# Patient Record
Sex: FEMALE | Race: WHITE | Employment: UNEMPLOYED
[De-identification: names, ages, dates, MRNs, and addresses within clinical notes are randomized per-mention and may not be internally consistent; named-entity substitution may affect disease eponyms.]

---

## 2017-03-08 ENCOUNTER — OFFICE VISIT (OUTPATIENT)
Dept: INTERNAL MEDICINE | Facility: CLINIC | Age: 67
End: 2017-03-08

## 2017-03-08 VITALS
DIASTOLIC BLOOD PRESSURE: 85 MMHG | SYSTOLIC BLOOD PRESSURE: 156 MMHG | HEART RATE: 77 BPM | WEIGHT: 261 LBS | OXYGEN SATURATION: 100 % | BODY MASS INDEX: 40.88 KG/M2

## 2017-03-08 DIAGNOSIS — M25.512 LEFT SHOULDER PAIN, UNSPECIFIED CHRONICITY: ICD-10-CM

## 2017-03-08 DIAGNOSIS — I10 ESSENTIAL HYPERTENSION: ICD-10-CM

## 2017-03-08 DIAGNOSIS — Z12.11 COLON CANCER SCREENING: Primary | ICD-10-CM

## 2017-03-08 DIAGNOSIS — M15.9 PRIMARY OSTEOARTHRITIS INVOLVING MULTIPLE JOINTS: ICD-10-CM

## 2017-03-08 DIAGNOSIS — Z12.39 BREAST CANCER SCREENING: ICD-10-CM

## 2017-03-08 DIAGNOSIS — E78.01 FAMILIAL HYPERCHOLESTEROLEMIA: ICD-10-CM

## 2017-03-08 DIAGNOSIS — F41.8 ANXIOUS DEPRESSION: ICD-10-CM

## 2017-03-08 DIAGNOSIS — K21.9 GASTROESOPHAGEAL REFLUX DISEASE WITHOUT ESOPHAGITIS: ICD-10-CM

## 2017-03-08 DIAGNOSIS — M25.571 ACUTE RIGHT ANKLE PAIN: ICD-10-CM

## 2017-03-08 DIAGNOSIS — Z13.9 SCREENING: ICD-10-CM

## 2017-03-08 DIAGNOSIS — Z91.81 AT HIGH RISK FOR FALLS: ICD-10-CM

## 2017-03-08 PROBLEM — M66.871 NONTRAUMATIC RUPTURE OF RIGHT POSTERIOR TIBIAL TENDON: Status: ACTIVE | Noted: 2017-03-08

## 2017-03-08 PROCEDURE — 99397 PER PM REEVAL EST PAT 65+ YR: CPT | Performed by: INTERNAL MEDICINE

## 2017-03-08 RX ORDER — PRAVASTATIN SODIUM 40 MG
40 TABLET ORAL DAILY
Qty: 90 TABLET | Refills: 3 | Status: SHIPPED | OUTPATIENT
Start: 2017-03-08 | End: 2017-12-18 | Stop reason: ALTCHOICE

## 2017-03-08 RX ORDER — TRIAMTERENE AND HYDROCHLOROTHIAZIDE 37.5; 25 MG/1; MG/1
1 TABLET ORAL DAILY PRN
Qty: 90 TABLET | Refills: 3 | Status: SHIPPED | OUTPATIENT
Start: 2017-03-08 | End: 2018-05-01 | Stop reason: SDUPTHER

## 2017-03-08 RX ORDER — CLORAZEPATE DIPOTASSIUM 7.5 MG/1
TABLET ORAL
Qty: 30 TABLET | Refills: 3 | Status: SHIPPED | OUTPATIENT
Start: 2017-03-08 | End: 2017-12-18 | Stop reason: ALTCHOICE

## 2017-03-08 RX ORDER — CIPROFLOXACIN 500 MG/1
500 TABLET, FILM COATED ORAL 2 TIMES DAILY
COMMUNITY
End: 2017-03-08 | Stop reason: SDUPTHER

## 2017-03-08 RX ORDER — POTASSIUM CHLORIDE 750 MG/1
10 TABLET, FILM COATED, EXTENDED RELEASE ORAL DAILY
Qty: 90 TABLET | Refills: 3 | Status: SHIPPED | OUTPATIENT
Start: 2017-03-08 | End: 2017-12-18 | Stop reason: SDUPTHER

## 2017-03-08 RX ORDER — CELECOXIB 200 MG/1
200 CAPSULE ORAL 2 TIMES DAILY
Qty: 180 CAPSULE | Refills: 1 | Status: SHIPPED | OUTPATIENT
Start: 2017-03-08 | End: 2018-05-01 | Stop reason: SDUPTHER

## 2017-03-08 RX ORDER — PANTOPRAZOLE SODIUM 40 MG/1
40 TABLET, DELAYED RELEASE ORAL DAILY
Qty: 90 TABLET | Refills: 3 | Status: SHIPPED | OUTPATIENT
Start: 2017-03-08 | End: 2018-05-01 | Stop reason: SDUPTHER

## 2017-03-08 RX ORDER — CIPROFLOXACIN 500 MG/1
500 TABLET, FILM COATED ORAL 2 TIMES DAILY
Qty: 20 TABLET | Refills: 0 | Status: SHIPPED | OUTPATIENT
Start: 2017-03-08 | End: 2018-06-18

## 2017-03-08 ASSESSMENT — PATIENT HEALTH QUESTIONNAIRE - PHQ9
SUM OF ALL RESPONSES TO PHQ QUESTIONS 1-9: 2
SUM OF ALL RESPONSES TO PHQ9 QUESTIONS 1 & 2: 2
2. FEELING DOWN, DEPRESSED OR HOPELESS: 1
1. LITTLE INTEREST OR PLEASURE IN DOING THINGS: 1

## 2017-03-10 ENCOUNTER — TELEPHONE (OUTPATIENT)
Dept: INTERNAL MEDICINE | Facility: CLINIC | Age: 67
End: 2017-03-10

## 2017-11-21 ENCOUNTER — TELEPHONE (OUTPATIENT)
Dept: INTERNAL MEDICINE | Age: 67
End: 2017-11-21

## 2017-12-18 ENCOUNTER — OFFICE VISIT (OUTPATIENT)
Dept: INTERNAL MEDICINE | Age: 67
End: 2017-12-18
Payer: MEDICARE

## 2017-12-18 VITALS
HEART RATE: 98 BPM | WEIGHT: 275 LBS | HEIGHT: 67 IN | DIASTOLIC BLOOD PRESSURE: 86 MMHG | SYSTOLIC BLOOD PRESSURE: 141 MMHG | OXYGEN SATURATION: 94 % | BODY MASS INDEX: 43.16 KG/M2

## 2017-12-18 DIAGNOSIS — E66.01 MORBID OBESITY WITH BMI OF 40.0-44.9, ADULT (HCC): ICD-10-CM

## 2017-12-18 DIAGNOSIS — K21.9 GASTROESOPHAGEAL REFLUX DISEASE WITHOUT ESOPHAGITIS: ICD-10-CM

## 2017-12-18 DIAGNOSIS — M19.90 OSTEOARTHRITIS, UNSPECIFIED OSTEOARTHRITIS TYPE, UNSPECIFIED SITE: Primary | ICD-10-CM

## 2017-12-18 DIAGNOSIS — E78.01 FAMILIAL HYPERCHOLESTEROLEMIA: ICD-10-CM

## 2017-12-18 DIAGNOSIS — F41.8 ANXIOUS DEPRESSION: ICD-10-CM

## 2017-12-18 PROCEDURE — 99214 OFFICE O/P EST MOD 30 MIN: CPT | Performed by: INTERNAL MEDICINE

## 2017-12-18 ASSESSMENT — ENCOUNTER SYMPTOMS
ABDOMINAL PAIN: 0
SINUS PRESSURE: 0
BACK PAIN: 1
ABDOMINAL DISTENTION: 0

## 2017-12-18 NOTE — PROGRESS NOTES
[facility]   Progress Note      Visit Information    Have you changed or started any medications since your last visit including any over-the-counter medicines, vitamins, or herbal medicines? no   Are you having any side effects from any of your medications? -  yes - Pravastatin, leg cramps and muscle pain  Have you stopped taking any of your medications? Is so, why? -  yes - Pravastatin    Have you seen any other physician or provider since your last visit? Yes - Records Obtained  Have you had any other diagnostic tests since your last visit? No  Have you been seen in the emergency room and/or had an admission to a hospital since we last saw you? No  Have you had your routine dental cleaning in the past 6 months? no    Have you activated your [employer] account? If not, what are your barriers?  Yes     Patient Care Team:  Treasure Méndez MD as PCP - General (Internal Medicine)  Laura Bunch MD as PCP - S Attributed Provider    Medical History Review  Past Medical, Family, and Social History reviewed and does contribute to the patient presenting condition    Health Maintenance   Topic Date Due    Breast cancer screen  03/03/2002    Colon cancer screen colonoscopy  03/03/2002    DEXA (modify frequency per FRAX score)  03/03/2017    Zostavax vaccine  01/18/2018 (Originally 3/3/2012)    Cervical cancer screen  01/18/2018 (Originally 3/3/1973)    Flu vaccine (1) 01/18/2018 (Originally 9/1/2017)    Pneumococcal low/med risk (1 of 2 - PCV13) 01/18/2018 (Originally 3/3/2017)    Hepatitis C screen  01/18/2018 (Originally 1950)    HIV screen  01/18/2018 (Originally 3/3/1967)    Lipid screen  10/27/2020         Date of patient's visit: 12/18/2017  Patient's Name:  Teri Khan                   YOB: 1950        PCP:  Treasure Méndez MD    Teri Khan is a 74 y.o. female who presents for   Chief Complaint   Patient presents with   Clgaylaia Marcellet Annual Exam    and follow up of chronic medical problems. Patient Active Problem List   Diagnosis    Left shoulder pain    Patellar dislocation    Osteoarthritis    HBP (high blood pressure)    Chronic fatigue and immune dysfunction syndrome (HCC)    GERD (gastroesophageal reflux disease)    Family history of premature CAD    History of repair of right rotator cuff    S/P lateral meniscectomy of left knee    Bone, giant cell tumor    CTS (carpal tunnel syndrome)    Anxious depression    Hyperlipidemia    Prediabetes    Nontraumatic rupture of right posterior tibial tendon       HISTORY OF PRESENT ILLNESS:    History was obtained from the patient. Patient here for routine follow-up. She has a history of hypertension on Maxzide and is well-controlled. She follows a DASH diet. Patient does report that she takes potassium supplements on the side  Patient also reports a history of depression from long-standing issues with job resignation and multiple accidents causing some component of posttraumatic stress disorder. She is on Zoloft and sees a therapist  Patient also has a history of GERD and is on Protonix, which controls symptoms  Patient has a history significant for osteoarthritis and uses Celebrex has had no break in the past and has not responded well to it  Patient's allergies, medications, past medical, surgical, social and family histories were reviewed and updated as appropriate.     ALLERGIES      Allergies   Allergen Reactions    Bee Venom Anaphylaxis    Tetanus Toxoids Anaphylaxis    Naproxen Swelling    Ancef [Cefazolin]     Formaldehyde          MEDICATIONS:      Current Outpatient Prescriptions   Medication Sig Dispense Refill    celecoxib (CELEBREX) 200 MG capsule Take 1 capsule by mouth 2 times daily 180 capsule 1    clorazepate (TRANXENE-T) 7.5 MG tablet One for anxiety if needed up to twice daily 30 tablet 3    potassium chloride (KLOR-CON 10) 10 MEQ extended release tablet Take 1 tablet by mouth daily 90 tablet 3    pravastatin (PRAVACHOL) 40 MG tablet Take 1 tablet by mouth daily 90 tablet 3    sertraline (ZOLOFT) 50 MG tablet Take 1 tablet by mouth daily 90 tablet 3    triamterene-hydrochlorothiazide (MAXZIDE-25) 37.5-25 MG per tablet Take 1 tablet by mouth daily as needed (edema) 90 tablet 3    pantoprazole (PROTONIX) 40 MG tablet Take 1 tablet by mouth daily 90 tablet 3    ciprofloxacin (CIPRO) 500 MG tablet Take 1 tablet by mouth 2 times daily Take 1 pill BID for 10 days 20 tablet 0    KLOR-CON M10 10 MEQ tablet       Blood Glucose Monitoring Suppl (BLOOD GLUCOSE METER) KIT Test ___ times daily    Dx: 250.00  Diabetes Mellitus____Insulin Dependent____Non-Insulin Dependent 1 kit 0    Glucose Blood (BLOOD GLUCOSE TEST STRIPS) STRP 1 Device by In Vitro route daily. Test___times daily    Diagnosis: 250.0   Diabetes Mellitus____Insulin Dependent____Non-Insulin Dependent 100 strip 11    Lancets MISC 1 each by Does not apply route daily. Use___times daily    Diagnisis:250.0  Diabetes Mellitus____Insulin Dependent___Non-Insulin Dependent 100 each 3     No current facility-administered medications for this visit. Patient Care Team:  Rina Borrego MD as PCP - General (Internal Medicine)  Kieran De La Torre MD as PCP - S Attributed Provider    PAST MEDICAL AND SURGICAL HISTORY:      Past Medical History:   Diagnosis Date    Hyperlipidemia 1/20/2015    Hypertension      No past surgical history on file. SOCIAL HISTORY      Social History   Substance Use Topics    Smoking status: Never Smoker    Smokeless tobacco: Never Used    Alcohol use Not on file     Coco  reports that she has never smoked. She has never used smokeless tobacco.    FAMILY HISTORY:    No family history on file. REVIEW OF SYSTEMS:    Review of Systems   Constitutional: Negative for activity change and appetite change. HENT: Negative for congestion, sinus pressure and sneezing. Cardiovascular: Negative for chest pain and leg swelling. Gastrointestinal: Negative for abdominal distention and abdominal pain. Endocrine: Negative for cold intolerance and heat intolerance. Genitourinary: Negative for difficulty urinating and menstrual problem. Musculoskeletal: Positive for arthralgias and back pain. Neurological: Negative for dizziness and headaches. Psychiatric/Behavioral: Negative for agitation and behavioral problems.        PHYSICAL EXAM:      Vitals:    12/18/17 1418   BP: (!) 141/86   Pulse: 98   SpO2: 94%     BP Readings from Last 3 Encounters:   12/18/17 (!) 141/86   03/08/17 (!) 156/85   10/27/15 148/83       Physical Examination: General appearance - alert, well appearing, and in no distress  Mental status - alert, oriented to person, place, and time  Eyes - pupils equal and reactive, extraocular eye movements intact  Chest - clear to auscultation, no wheezes, rales or rhonchi, symmetric air entry  Heart - normal rate, regular rhythm, normal S1, S2, no murmurs, rubs, clicks or gallops  Abdomen - soft, nontender, nondistended, no masses or organomegaly  Back exam - full range of motion, no tenderness, palpable spasm or pain on motion  Neurological - alert, oriented, normal speech, no focal findings or movement disorder noted  Musculoskeletal - joint tenderness, deformity, swelling  Extremities - peripheral pulses normal,    LABORATORY FINDINGS:    CBC:   Lab Results   Component Value Date    WBC 10.3 10/10/2014    HGB 14.5 10/10/2014     10/10/2014     BMP:    Lab Results   Component Value Date     02/28/2015    K 4.6 02/28/2015    CL 99 02/28/2015    CO2 23 02/28/2015    BUN 20 02/28/2015    CREATININE 0.9 09/29/2016    CREATININE 0.80 02/28/2015    GLUCOSE 113 02/28/2015     Hemoglobin A1C:   Lab Results   Component Value Date    LABA1C 5.5 10/27/2015     Microalbumin Urine:   Lab Results   Component Value Date    MICROALBUR <12 02/28/2015     Lipid profile: Lab Results   Component Value Date    CHOL 262 10/27/2015    TRIG 123 10/27/2015    HDL 55 10/27/2015     Thyroid functions:   Lab Results   Component Value Date    TSH 2.78 10/27/2015      Hepatic functions:   Lab Results   Component Value Date    ALT 20 10/27/2015    AST 20 10/27/2015    PROT 7.5 02/28/2015    BILITOT 0.44 02/28/2015    LABALBU 4.3 02/28/2015     Urine Analysis: No results found for: [address] [place]:      Health Maintenance Due   Topic Date Due    Breast cancer screen  03/03/2002    Colon cancer screen colonoscopy  03/03/2002    DEXA (modify frequency per FRAX score)  03/03/2017       ASSESSMENT AND PLAN:      1. Osteoarthritis, unspecified osteoarthritis type, unspecified site  Patient takes Celebrex daily    2. Morbid obesity with BMI of 40.0-44.9, adult [facility])  The patient is asked to make an attempt to improve diet and exercise patterns to aid in medical management of this problem. 3. Gastroesophageal reflux disease without esophagitis  , Stable with Protonix    4. Familial hypercholesterolemia     5. Anxious depression  On Zoloft daily     Will rtc in 6 months  Labs at next visit- K, Lipids, TSH, Vit d        FOLLOW UP:   1. Coco received counseling on the following healthy behaviors: nutrition and exercise    2. Reviewed prior labs and health maintenance. 3.  Discussed use, benefit, and side effects of prescribed medications. Barriers to medication compliance addressed. All patient questions answered. Pt voiced understanding. 4.  Continue current medications, diet and exercise. No orders of the defined types were placed in this encounter. Completed Refills               Requested Prescriptions      No prescriptions requested or ordered in this encounter       5. Patient given educational materials - see patient instructions    6. Was a self-tracking handout given in paper form or via [employer]? Yes  If yes, see orders or list here.       No orders of the defined types were placed in this encounter. Return in about 6 months (around 6/18/2018) for routine follow up. Patient voiced understanding and agreed to treatment plan. This note is created with the assistance of a speech-recognition program. While intending to generate a document that actually reflects the content of the visit, the document can still have some mistakes which may not have been identified and corrected by editing.     Dr Destiney Geiger MD, [address]  Associate , Department of Internal Medicine  [facility]  Attending [address] [facility], [facility] Farhana Aparicio [facility] 88                   12/18/2017, 2:58 PM

## 2018-05-01 DIAGNOSIS — M15.9 PRIMARY OSTEOARTHRITIS INVOLVING MULTIPLE JOINTS: ICD-10-CM

## 2018-05-01 DIAGNOSIS — M25.512 LEFT SHOULDER PAIN, UNSPECIFIED CHRONICITY: ICD-10-CM

## 2018-05-01 DIAGNOSIS — F41.8 ANXIOUS DEPRESSION: ICD-10-CM

## 2018-05-01 DIAGNOSIS — K21.9 GASTROESOPHAGEAL REFLUX DISEASE WITHOUT ESOPHAGITIS: ICD-10-CM

## 2018-05-01 DIAGNOSIS — I10 ESSENTIAL HYPERTENSION: ICD-10-CM

## 2018-05-01 RX ORDER — CELECOXIB 200 MG/1
CAPSULE ORAL
Qty: 180 CAPSULE | Refills: 2 | Status: SHIPPED | OUTPATIENT
Start: 2018-05-01 | End: 2018-05-01 | Stop reason: SDUPTHER

## 2018-05-01 RX ORDER — TRIAMTERENE AND HYDROCHLOROTHIAZIDE 37.5; 25 MG/1; MG/1
TABLET ORAL
Qty: 90 TABLET | Refills: 2 | Status: SHIPPED | OUTPATIENT
Start: 2018-05-01 | End: 2018-05-01 | Stop reason: SDUPTHER

## 2018-05-01 RX ORDER — PANTOPRAZOLE SODIUM 40 MG/1
TABLET, DELAYED RELEASE ORAL
Qty: 90 TABLET | Refills: 2 | Status: SHIPPED | OUTPATIENT
Start: 2018-05-01 | End: 2018-05-01 | Stop reason: SDUPTHER

## 2018-05-01 RX ORDER — POTASSIUM CHLORIDE 750 MG/1
TABLET, FILM COATED, EXTENDED RELEASE ORAL
Qty: 90 TABLET | Refills: 2 | Status: SHIPPED | OUTPATIENT
Start: 2018-05-01 | End: 2018-05-01 | Stop reason: SDUPTHER

## 2018-05-02 RX ORDER — PANTOPRAZOLE SODIUM 40 MG/1
TABLET, DELAYED RELEASE ORAL
Qty: 90 TABLET | Refills: 3 | Status: SHIPPED | OUTPATIENT
Start: 2018-05-02 | End: 2019-03-25 | Stop reason: SDUPTHER

## 2018-05-02 RX ORDER — TRIAMTERENE AND HYDROCHLOROTHIAZIDE 37.5; 25 MG/1; MG/1
TABLET ORAL
Qty: 90 TABLET | Refills: 3 | Status: SHIPPED | OUTPATIENT
Start: 2018-05-02 | End: 2019-03-25 | Stop reason: SDUPTHER

## 2018-05-02 RX ORDER — CELECOXIB 200 MG/1
CAPSULE ORAL
Qty: 180 CAPSULE | Refills: 3 | Status: SHIPPED | OUTPATIENT
Start: 2018-05-02 | End: 2019-03-25 | Stop reason: SDUPTHER

## 2018-05-02 RX ORDER — POTASSIUM CHLORIDE 750 MG/1
TABLET, FILM COATED, EXTENDED RELEASE ORAL
Qty: 90 TABLET | Refills: 3 | Status: SHIPPED | OUTPATIENT
Start: 2018-05-02 | End: 2019-03-25 | Stop reason: SDUPTHER

## 2018-06-18 ENCOUNTER — OFFICE VISIT (OUTPATIENT)
Dept: INTERNAL MEDICINE | Age: 68
End: 2018-06-18
Payer: MEDICARE

## 2018-06-18 VITALS
RESPIRATION RATE: 12 BRPM | SYSTOLIC BLOOD PRESSURE: 139 MMHG | BODY MASS INDEX: 40.93 KG/M2 | OXYGEN SATURATION: 100 % | DIASTOLIC BLOOD PRESSURE: 88 MMHG | WEIGHT: 260.8 LBS | HEIGHT: 67 IN | HEART RATE: 100 BPM

## 2018-06-18 DIAGNOSIS — D89.89 CHRONIC FATIGUE AND IMMUNE DYSFUNCTION SYNDROME (HCC): ICD-10-CM

## 2018-06-18 DIAGNOSIS — Z78.0 POST-MENOPAUSAL: ICD-10-CM

## 2018-06-18 DIAGNOSIS — Z12.11 SCREENING FOR COLON CANCER: ICD-10-CM

## 2018-06-18 DIAGNOSIS — E66.01 MORBID OBESITY WITH BMI OF 40.0-44.9, ADULT (HCC): ICD-10-CM

## 2018-06-18 DIAGNOSIS — G93.32 CHRONIC FATIGUE AND IMMUNE DYSFUNCTION SYNDROME (HCC): ICD-10-CM

## 2018-06-18 DIAGNOSIS — E78.01 FAMILIAL HYPERCHOLESTEROLEMIA: ICD-10-CM

## 2018-06-18 DIAGNOSIS — R53.83 FATIGUE, UNSPECIFIED TYPE: ICD-10-CM

## 2018-06-18 DIAGNOSIS — E11.9 TYPE 2 DIABETES MELLITUS WITHOUT COMPLICATION, WITHOUT LONG-TERM CURRENT USE OF INSULIN (HCC): Primary | ICD-10-CM

## 2018-06-18 DIAGNOSIS — M89.9 DISORDER OF BONE: ICD-10-CM

## 2018-06-18 DIAGNOSIS — R73.9 HIGH BLOOD SUGAR: ICD-10-CM

## 2018-06-18 DIAGNOSIS — Z12.31 ENCOUNTER FOR SCREENING MAMMOGRAM FOR BREAST CANCER: ICD-10-CM

## 2018-06-18 DIAGNOSIS — F41.8 ANXIOUS DEPRESSION: ICD-10-CM

## 2018-06-18 LAB — HBA1C MFR BLD: 14 %

## 2018-06-18 PROCEDURE — 83036 HEMOGLOBIN GLYCOSYLATED A1C: CPT | Performed by: INTERNAL MEDICINE

## 2018-06-18 PROCEDURE — 99214 OFFICE O/P EST MOD 30 MIN: CPT | Performed by: INTERNAL MEDICINE

## 2018-06-18 RX ORDER — GLIMEPIRIDE 4 MG/1
4 TABLET ORAL EVERY MORNING
Qty: 30 TABLET | Refills: 3 | Status: SHIPPED | OUTPATIENT
Start: 2018-06-18 | End: 2018-07-31 | Stop reason: SDUPTHER

## 2018-06-18 ASSESSMENT — PATIENT HEALTH QUESTIONNAIRE - PHQ9
2. FEELING DOWN, DEPRESSED OR HOPELESS: 0
1. LITTLE INTEREST OR PLEASURE IN DOING THINGS: 0
SUM OF ALL RESPONSES TO PHQ QUESTIONS 1-9: 0
SUM OF ALL RESPONSES TO PHQ9 QUESTIONS 1 & 2: 0

## 2018-06-18 ASSESSMENT — ENCOUNTER SYMPTOMS
EYES NEGATIVE: 1
ALLERGIC/IMMUNOLOGIC NEGATIVE: 1
RESPIRATORY NEGATIVE: 1
GASTROINTESTINAL NEGATIVE: 1

## 2018-06-19 ENCOUNTER — HOSPITAL ENCOUNTER (OUTPATIENT)
Age: 68
Discharge: HOME | End: 2018-06-19
Payer: MEDICARE

## 2018-06-19 ENCOUNTER — TELEPHONE (OUTPATIENT)
Dept: INTERNAL MEDICINE | Age: 68
End: 2018-06-19

## 2018-06-19 DIAGNOSIS — E78.01 FAMILIAL HYPERCHOLESTEROLEMIA: Primary | ICD-10-CM

## 2018-06-19 DIAGNOSIS — R53.83 FATIGUE, UNSPECIFIED TYPE: ICD-10-CM

## 2018-06-19 DIAGNOSIS — E11.9 TYPE 2 DIABETES MELLITUS WITHOUT COMPLICATION, WITHOUT LONG-TERM CURRENT USE OF INSULIN (HCC): ICD-10-CM

## 2018-06-19 DIAGNOSIS — M89.9 DISORDER OF BONE: ICD-10-CM

## 2018-06-19 DIAGNOSIS — E78.01 FAMILIAL HYPERCHOLESTEROLEMIA: ICD-10-CM

## 2018-06-19 LAB
ABSOLUTE EOS #: 0.14 K/UL (ref 0–0.44)
ABSOLUTE IMMATURE GRANULOCYTE: 0.04 K/UL (ref 0–0.3)
ABSOLUTE LYMPH #: 4.04 K/UL (ref 1.1–3.7)
ABSOLUTE MONO #: 0.54 K/UL (ref 0.1–1.2)
ALBUMIN SERPL-MCNC: 4.4 G/DL (ref 3.5–5.2)
ALBUMIN/GLOBULIN RATIO: 1.4 (ref 1–2.5)
ALP BLD-CCNC: 138 U/L (ref 35–104)
ALT SERPL-CCNC: 21 U/L (ref 5–33)
ANION GAP SERPL CALCULATED.3IONS-SCNC: 17 MMOL/L (ref 9–17)
AST SERPL-CCNC: 17 U/L
BASOPHILS # BLD: 1 % (ref 0–2)
BASOPHILS ABSOLUTE: 0.05 K/UL (ref 0–0.2)
BILIRUB SERPL-MCNC: 0.51 MG/DL (ref 0.3–1.2)
BUN BLDV-MCNC: 15 MG/DL (ref 8–23)
BUN/CREAT BLD: ABNORMAL (ref 9–20)
CALCIUM SERPL-MCNC: 9.5 MG/DL (ref 8.6–10.4)
CHLORIDE BLD-SCNC: 96 MMOL/L (ref 98–107)
CHOLESTEROL/HDL RATIO: 6.9
CHOLESTEROL: 325 MG/DL
CO2: 22 MMOL/L (ref 20–31)
CREAT SERPL-MCNC: 0.69 MG/DL (ref 0.5–0.9)
CREATININE URINE: 117 MG/DL (ref 28–217)
DIFFERENTIAL TYPE: ABNORMAL
EOSINOPHILS RELATIVE PERCENT: 1 % (ref 1–4)
GFR AFRICAN AMERICAN: >60 ML/MIN
GFR NON-AFRICAN AMERICAN: >60 ML/MIN
GFR SERPL CREATININE-BSD FRML MDRD: ABNORMAL ML/MIN/{1.73_M2}
GFR SERPL CREATININE-BSD FRML MDRD: ABNORMAL ML/MIN/{1.73_M2}
GLUCOSE BLD-MCNC: 338 MG/DL (ref 70–99)
HCT VFR BLD CALC: 46.5 % (ref 36.3–47.1)
HDLC SERPL-MCNC: 47 MG/DL
HEMOGLOBIN: 15.6 G/DL (ref 11.9–15.1)
IMMATURE GRANULOCYTES: 0 %
LDL CHOLESTEROL: 249 MG/DL (ref 0–130)
LYMPHOCYTES # BLD: 38 % (ref 24–43)
MCH RBC QN AUTO: 29.2 PG (ref 25.2–33.5)
MCHC RBC AUTO-ENTMCNC: 33.5 G/DL (ref 28.4–34.8)
MCV RBC AUTO: 86.9 FL (ref 82.6–102.9)
MONOCYTES # BLD: 5 % (ref 3–12)
NRBC AUTOMATED: 0 PER 100 WBC
PDW BLD-RTO: 12.9 % (ref 11.8–14.4)
PLATELET # BLD: 238 K/UL (ref 138–453)
PLATELET ESTIMATE: ABNORMAL
PMV BLD AUTO: 10.6 FL (ref 8.1–13.5)
POTASSIUM SERPL-SCNC: 4.2 MMOL/L (ref 3.7–5.3)
RBC # BLD: 5.35 M/UL (ref 3.95–5.11)
RBC # BLD: ABNORMAL 10*6/UL
SEG NEUTROPHILS: 55 % (ref 36–65)
SEGMENTED NEUTROPHILS ABSOLUTE COUNT: 5.83 K/UL (ref 1.5–8.1)
SODIUM BLD-SCNC: 135 MMOL/L (ref 135–144)
TOTAL PROTEIN, URINE: 23 MG/DL
TOTAL PROTEIN: 7.5 G/DL (ref 6.4–8.3)
TRIGL SERPL-MCNC: 145 MG/DL
TSH SERPL DL<=0.05 MIU/L-ACNC: 2.16 MIU/L (ref 0.3–5)
VLDLC SERPL CALC-MCNC: ABNORMAL MG/DL (ref 1–30)
WBC # BLD: 10.6 K/UL (ref 3.5–11.3)
WBC # BLD: ABNORMAL 10*3/UL

## 2018-06-19 PROCEDURE — 80053 COMPREHEN METABOLIC PANEL: CPT

## 2018-06-19 PROCEDURE — 84443 ASSAY THYROID STIM HORMONE: CPT

## 2018-06-19 PROCEDURE — 85025 COMPLETE CBC W/AUTO DIFF WBC: CPT

## 2018-06-19 PROCEDURE — 84156 ASSAY OF PROTEIN URINE: CPT

## 2018-06-19 PROCEDURE — 80061 LIPID PANEL: CPT

## 2018-06-19 PROCEDURE — 82570 ASSAY OF URINE CREATININE: CPT

## 2018-06-19 PROCEDURE — 36415 COLL VENOUS BLD VENIPUNCTURE: CPT

## 2018-06-19 PROCEDURE — 82306 VITAMIN D 25 HYDROXY: CPT

## 2018-06-19 RX ORDER — ATORVASTATIN CALCIUM 40 MG/1
40 TABLET, FILM COATED ORAL DAILY
Qty: 30 TABLET | Refills: 3 | Status: SHIPPED | OUTPATIENT
Start: 2018-06-19 | End: 2018-08-16 | Stop reason: SINTOL

## 2018-06-20 ENCOUNTER — TELEPHONE (OUTPATIENT)
Dept: INTERNAL MEDICINE | Age: 68
End: 2018-06-20

## 2018-06-21 LAB — VITAMIN D 25-HYDROXY: 17.4 NG/ML (ref 30–100)

## 2018-06-25 ENCOUNTER — TELEPHONE (OUTPATIENT)
Dept: INTERNAL MEDICINE | Age: 68
End: 2018-06-25

## 2018-06-26 DIAGNOSIS — R73.03 PREDIABETES: Primary | ICD-10-CM

## 2018-07-13 ENCOUNTER — TELEPHONE (OUTPATIENT)
Dept: INTERNAL MEDICINE | Age: 68
End: 2018-07-13

## 2018-07-20 DIAGNOSIS — R73.9 HYPERGLYCEMIA: ICD-10-CM

## 2018-07-20 NOTE — TELEPHONE ENCOUNTER
tumor     CTS (carpal tunnel syndrome)     Anxious depression     Hyperlipidemia     Prediabetes     Nontraumatic rupture of right posterior tibial tendon

## 2018-07-23 RX ORDER — GLUCOSAMINE HCL/CHONDROITIN SU 500-400 MG
CAPSULE ORAL DAILY
Qty: 100 STRIP | Refills: 5 | Status: SHIPPED | OUTPATIENT
Start: 2018-07-23 | End: 2019-12-31 | Stop reason: SDUPTHER

## 2018-07-23 RX ORDER — LANCETS 30 GAUGE
1 EACH MISCELLANEOUS DAILY
Qty: 100 EACH | Refills: 5 | Status: SHIPPED | OUTPATIENT
Start: 2018-07-23

## 2018-07-31 DIAGNOSIS — E11.9 TYPE 2 DIABETES MELLITUS WITHOUT COMPLICATION, WITHOUT LONG-TERM CURRENT USE OF INSULIN (HCC): ICD-10-CM

## 2018-07-31 RX ORDER — GLIMEPIRIDE 4 MG/1
4 TABLET ORAL EVERY MORNING
Qty: 90 TABLET | Refills: 1 | Status: SHIPPED | OUTPATIENT
Start: 2018-07-31 | End: 2019-02-04 | Stop reason: ALTCHOICE

## 2018-08-16 DIAGNOSIS — R73.03 PREDIABETES: ICD-10-CM

## 2018-08-16 NOTE — TELEPHONE ENCOUNTER
Evelyn Rasmussen,     Please contact [facility] to confirm fill of Atorvastatin through [facility]. Thank you,  Bar Regan, PharmD  [address]   Direct: [phone]  Department, toll free: [phone], option 7  ==============================================================  CLINICAL PHARMACY CONSULT: MEDICATION RECONCILIATION/REVIEW    Tamara Mckeon is a 79 y.o. female referred to clinical pharmacist for Medication Review. SUBJECTIVE:   Identified as DM care gap for [employer]: statin therapy. OBJECTIVE:  Allergies   Allergen Reactions    Bee Venom Anaphylaxis    Tetanus Toxoids Anaphylaxis    Naproxen Swelling    Ancef [Cefazolin]     Formaldehyde        Medications per current medication list:  Current Outpatient Prescriptions   Medication Sig Dispense Refill    atorvastatin (LIPITOR) 40 MG tablet Take 1 tablet by mouth daily 30 tablet 3     Labs:  Lab Results   Component Value Date    CHOL 325 (H) 06/19/2018    CHOL 262 (H) 10/27/2015    CHOL 318 (H) 02/28/2015     Lab Results   Component Value Date    TRIG 145 06/19/2018    TRIG 123 10/27/2015    TRIG 140 02/28/2015     Lab Results   Component Value Date    HDL 47 06/19/2018    HDL 55 10/27/2015    HDL 46 02/28/2015     Lab Results   Component Value Date    LDLCHOLESTEROL 249 (H) 06/19/2018    LDLCHOLESTEROL 182 (H) 10/27/2015    LDLCHOLESTEROL 244 (H) 02/28/2015     Lab Results   Component Value Date    VLDL NOT REPORTED 06/19/2018    VLDL NOT REPORTED 10/27/2015    VLDL NOT REPORTED 02/28/2015     Lab Results   Component Value Date    CHOLHDLRATIO 6.9 (H) 06/19/2018    CHOLHDLRATIO 4.8 10/27/2015    CHOLHDLRATIO 6.9 (H) 02/28/2015     Lab Results   Component Value Date    ALT 21 06/19/2018      PLAN:  - Medications:  Atorvastatin ordered 6/19/18 and sent to [facility]. Will need to confirm fill.      Thank you,    Sofie Koch, PharmD  [address] [facility] Pharmacist  [phone] or

## 2018-08-16 NOTE — TELEPHONE ENCOUNTER
Patient returned called as requested. Per patient she was taking the Atorvastatin \"on and off\" but developed muscle cramping so she stopped it. She saw her PCP in June 2018 and advised her Provider of this side affect. Patient tried Janumet but it made her sick to her stomach so her Provider changed her to Metformin alone.     Patient needs a new 90-day supply prescription for the following:    metFORMIN (GLUCOPHAGE) 500 MG  Sig: Take 1 tablet by mouth 2 times daily (with meals)  Shadia Pro, [place] [facility] [phone]  Authorizing Provider:  Silvino Rios MD

## 2018-10-01 ENCOUNTER — OFFICE VISIT (OUTPATIENT)
Dept: INTERNAL MEDICINE | Age: 68
End: 2018-10-01
Payer: MEDICARE

## 2018-10-01 VITALS
HEIGHT: 67 IN | RESPIRATION RATE: 12 BRPM | WEIGHT: 249.8 LBS | TEMPERATURE: 98.6 F | OXYGEN SATURATION: 98 % | HEART RATE: 64 BPM | SYSTOLIC BLOOD PRESSURE: 124 MMHG | BODY MASS INDEX: 39.21 KG/M2 | DIASTOLIC BLOOD PRESSURE: 87 MMHG

## 2018-10-01 DIAGNOSIS — Z78.0 POST-MENOPAUSAL: ICD-10-CM

## 2018-10-01 DIAGNOSIS — K21.9 GASTROESOPHAGEAL REFLUX DISEASE WITHOUT ESOPHAGITIS: ICD-10-CM

## 2018-10-01 DIAGNOSIS — Z12.31 ENCOUNTER FOR SCREENING MAMMOGRAM FOR BREAST CANCER: ICD-10-CM

## 2018-10-01 DIAGNOSIS — Z12.39 BREAST CANCER SCREENING: ICD-10-CM

## 2018-10-01 DIAGNOSIS — I10 HYPERTENSION, UNSPECIFIED TYPE: ICD-10-CM

## 2018-10-01 DIAGNOSIS — M89.9 DISORDER OF BONE: ICD-10-CM

## 2018-10-01 DIAGNOSIS — Z13.820 OSTEOPOROSIS SCREENING: ICD-10-CM

## 2018-10-01 DIAGNOSIS — E11.9 TYPE 2 DIABETES MELLITUS WITHOUT COMPLICATION, WITHOUT LONG-TERM CURRENT USE OF INSULIN (HCC): Primary | ICD-10-CM

## 2018-10-01 LAB
GLUCOSE BLD-MCNC: 93 MG/DL
HBA1C MFR BLD: 5.9 %

## 2018-10-01 PROCEDURE — 82962 GLUCOSE BLOOD TEST: CPT | Performed by: INTERNAL MEDICINE

## 2018-10-01 PROCEDURE — 83036 HEMOGLOBIN GLYCOSYLATED A1C: CPT | Performed by: INTERNAL MEDICINE

## 2018-10-01 PROCEDURE — 99214 OFFICE O/P EST MOD 30 MIN: CPT | Performed by: INTERNAL MEDICINE

## 2018-10-01 RX ORDER — CIPROFLOXACIN 500 MG/1
500 TABLET, FILM COATED ORAL 2 TIMES DAILY
Qty: 20 TABLET | Refills: 0 | Status: SHIPPED | OUTPATIENT
Start: 2018-10-01 | End: 2019-03-25 | Stop reason: ALTCHOICE

## 2018-10-01 ASSESSMENT — ENCOUNTER SYMPTOMS
DIARRHEA: 1
ABDOMINAL PAIN: 1
RESPIRATORY NEGATIVE: 1
EYES NEGATIVE: 1
ALLERGIC/IMMUNOLOGIC NEGATIVE: 1

## 2018-10-01 NOTE — PROGRESS NOTES
Date    CHOL 325 (H) 06/19/2018    TRIG 145 06/19/2018    HDL 47 06/19/2018      Doing well overall. Symptoms all improved    Patient's allergies, medications, past medical, surgical, social and family histories were reviewed and updated as appropriate. ALLERGIES      Allergies   Allergen Reactions    Bee Venom Anaphylaxis    Tetanus Toxoids Anaphylaxis    Naproxen Swelling    Ancef [Cefazolin]     Atorvastatin Other (See Comments)     Muscle cramping    Formaldehyde          MEDICATIONS:      Current Outpatient Prescriptions   Medication Sig Dispense Refill    metFORMIN (GLUCOPHAGE) 500 MG tablet Take 1 tablet by mouth 2 times daily (with meals) 180 tablet 3    glimepiride (AMARYL) 4 MG tablet Take 1 tablet by mouth every morning 90 tablet 1    blood glucose monitor strips by Other route daily Test___times daily    Diagnosis: 250.0   Diabetes Mellitus____Insulin Dependent____Non-Insulin Dependent 100 strip 5    Lancets MISC 1 each by Does not apply route daily Use___times daily    Diagnisis:250.0  Diabetes Mellitus____Insulin Dependent___Non-Insulin Dependent 100 each 5    celecoxib (CELEBREX) 200 MG capsule TAKE ONE CAPSULE BY MOUTH TWICE A  capsule 3    pantoprazole (PROTONIX) 40 MG tablet TAKE ONE TABLET BY MOUTH DAILY 90 tablet 3    potassium chloride (KLOR-CON) 10 MEQ extended release tablet TAKE ONE TABLET BY MOUTH DAILY 90 tablet 3    sertraline (ZOLOFT) 50 MG tablet TAKE ONE TABLET BY MOUTH DAILY 90 tablet 3    triamterene-hydrochlorothiazide (MAXZIDE-25) 37.5-25 MG per tablet TAKE ONE TABLET BY MOUTH DAILY AS NEEDED FOR (EDEMA) 90 tablet 3    Blood Glucose Monitoring Suppl (BLOOD GLUCOSE METER) KIT Test ___ times daily    Dx: 250.00  Diabetes Mellitus____Insulin Dependent____Non-Insulin Dependent 1 kit 0     No current facility-administered medications for this visit.         Patient Care Team:  Dane Krishna MD as PCP - General (Internal Medicine)  Dane Krishna MD as PCP - S or swelling  Extremities - no pedal edema noted    LABORATORY FINDINGS:    CBC:   Lab Results   Component Value Date    WBC 10.6 06/19/2018    HGB 15.6 06/19/2018     06/19/2018     BMP:    Lab Results   Component Value Date     06/19/2018    K 4.2 06/19/2018    CL 96 06/19/2018    CO2 22 06/19/2018    BUN 15 06/19/2018    CREATININE 0.69 06/19/2018    GLUCOSE 93 10/01/2018     Hemoglobin A1C:   Lab Results   Component Value Date    LABA1C 5.9 10/01/2018     Microalbumin Urine:   Lab Results   Component Value Date    MICROALBUR <12 02/28/2015     Lipid profile:   Lab Results   Component Value Date    CHOL 325 06/19/2018    TRIG 145 06/19/2018    HDL 47 06/19/2018     Thyroid functions:   Lab Results   Component Value Date    TSH 2.16 06/19/2018      Hepatic functions:   Lab Results   Component Value Date    ALT 21 06/19/2018    AST 17 06/19/2018    PROT 7.5 06/19/2018    BILITOT 0.51 06/19/2018    LABALBU 4.4 06/19/2018     Urine Analysis: No results found for: [address] [place]:      Health Maintenance Due   Topic Date Due    Breast cancer screen  03/03/2002    Diabetic microalbuminuria test  02/28/2016    DEXA (modify frequency per FRAX score)  03/03/2017    A1C test (Diabetic or Prediabetic)  09/18/2018       ASSESSMENT AND PLAN:       Diagnosis Orders   1. Type 2 diabetes mellitus without complication, without long-term current use of insulin (HCC)  Continue metformin and amaryl. If she remains pre diabetic will d/c amaryl. in 6 months     2. Post-menopausal     3. Encounter for screening mammogram for breast cancer  [facility] Digital Screen Bilateral [[id number]]   4. Hypertension, unspecified type  controlled   5. Gastroesophageal reflux disease without esophagitis  Stable    6. Breast cancer screening  [facility] Digital Screen Bilateral [[id number]]   7. Osteoporosis screening  DEXA Bone Density Axial Skeleton   8.  Disorder of bone   DEXA Bone Density Axial Skeleton     rtc in 6 months    FOLLOW UP:

## 2018-10-24 ENCOUNTER — TELEPHONE (OUTPATIENT)
Dept: INTERNAL MEDICINE | Age: 68
End: 2018-10-24

## 2018-10-24 DIAGNOSIS — E11.9 TYPE 2 DIABETES MELLITUS WITHOUT COMPLICATION, WITHOUT LONG-TERM CURRENT USE OF INSULIN (HCC): ICD-10-CM

## 2018-11-29 DIAGNOSIS — F41.8 ANXIOUS DEPRESSION: ICD-10-CM

## 2019-02-03 DIAGNOSIS — E11.9 TYPE 2 DIABETES MELLITUS WITHOUT COMPLICATION, WITHOUT LONG-TERM CURRENT USE OF INSULIN (HCC): ICD-10-CM

## 2019-02-04 RX ORDER — GLIMEPIRIDE 4 MG/1
TABLET ORAL
Qty: 30 TABLET | Refills: 2 | Status: SHIPPED | OUTPATIENT
Start: 2019-02-04 | End: 2019-03-25

## 2019-03-25 ENCOUNTER — OFFICE VISIT (OUTPATIENT)
Dept: PRIMARY CARE CLINIC | Age: 69
End: 2019-03-25
Payer: MEDICARE

## 2019-03-25 VITALS
TEMPERATURE: 98.8 F | SYSTOLIC BLOOD PRESSURE: 130 MMHG | WEIGHT: 249 LBS | DIASTOLIC BLOOD PRESSURE: 82 MMHG | HEART RATE: 89 BPM | BODY MASS INDEX: 41.48 KG/M2 | OXYGEN SATURATION: 96 % | HEIGHT: 65 IN

## 2019-03-25 DIAGNOSIS — E66.01 MORBID OBESITY WITH BMI OF 40.0-44.9, ADULT (HCC): ICD-10-CM

## 2019-03-25 DIAGNOSIS — E11.9 TYPE 2 DIABETES MELLITUS WITHOUT COMPLICATION, WITHOUT LONG-TERM CURRENT USE OF INSULIN (HCC): ICD-10-CM

## 2019-03-25 DIAGNOSIS — F41.8 ANXIOUS DEPRESSION: ICD-10-CM

## 2019-03-25 DIAGNOSIS — R73.03 PREDIABETES: ICD-10-CM

## 2019-03-25 DIAGNOSIS — I10 ESSENTIAL HYPERTENSION: ICD-10-CM

## 2019-03-25 DIAGNOSIS — E55.9 VITAMIN D DEFICIENCY: ICD-10-CM

## 2019-03-25 DIAGNOSIS — E11.9 CONTROLLED TYPE 2 DIABETES MELLITUS WITHOUT COMPLICATION, WITHOUT LONG-TERM CURRENT USE OF INSULIN (HCC): Primary | ICD-10-CM

## 2019-03-25 DIAGNOSIS — M25.512 LEFT SHOULDER PAIN, UNSPECIFIED CHRONICITY: ICD-10-CM

## 2019-03-25 DIAGNOSIS — M15.9 PRIMARY OSTEOARTHRITIS INVOLVING MULTIPLE JOINTS: ICD-10-CM

## 2019-03-25 DIAGNOSIS — K21.9 GASTROESOPHAGEAL REFLUX DISEASE WITHOUT ESOPHAGITIS: ICD-10-CM

## 2019-03-25 LAB — HBA1C MFR BLD: 5.6 %

## 2019-03-25 PROCEDURE — 99214 OFFICE O/P EST MOD 30 MIN: CPT | Performed by: NURSE PRACTITIONER

## 2019-03-25 PROCEDURE — 83036 HEMOGLOBIN GLYCOSYLATED A1C: CPT | Performed by: NURSE PRACTITIONER

## 2019-03-25 RX ORDER — SERTRALINE HYDROCHLORIDE 100 MG/1
TABLET, FILM COATED ORAL
Qty: 90 TABLET | Refills: 2 | Status: SHIPPED | OUTPATIENT
Start: 2019-03-25 | End: 2019-06-28 | Stop reason: SDUPTHER

## 2019-03-25 RX ORDER — CELECOXIB 200 MG/1
CAPSULE ORAL
Qty: 180 CAPSULE | Refills: 3 | Status: SHIPPED | OUTPATIENT
Start: 2019-03-25 | End: 2020-07-20 | Stop reason: SDUPTHER

## 2019-03-25 RX ORDER — GLIMEPIRIDE 4 MG/1
2 TABLET ORAL
Qty: 30 TABLET | Refills: 2 | Status: SHIPPED | OUTPATIENT
Start: 2019-03-25 | End: 2019-05-08 | Stop reason: SDUPTHER

## 2019-03-25 RX ORDER — PANTOPRAZOLE SODIUM 40 MG/1
TABLET, DELAYED RELEASE ORAL
Qty: 90 TABLET | Refills: 3 | Status: SHIPPED | OUTPATIENT
Start: 2019-03-25 | End: 2020-03-26

## 2019-03-25 RX ORDER — POTASSIUM CHLORIDE 750 MG/1
TABLET, FILM COATED, EXTENDED RELEASE ORAL
Qty: 90 TABLET | Refills: 3 | Status: SHIPPED | OUTPATIENT
Start: 2019-03-25 | End: 2020-03-26

## 2019-03-25 RX ORDER — TRIAMTERENE AND HYDROCHLOROTHIAZIDE 37.5; 25 MG/1; MG/1
TABLET ORAL
Qty: 90 TABLET | Refills: 3 | Status: SHIPPED | OUTPATIENT
Start: 2019-03-25 | End: 2020-07-20 | Stop reason: SDUPTHER

## 2019-03-25 ASSESSMENT — ENCOUNTER SYMPTOMS
ABDOMINAL PAIN: 0
SHORTNESS OF BREATH: 0
COUGH: 0
NAUSEA: 0

## 2019-04-16 ENCOUNTER — HOSPITAL ENCOUNTER (OUTPATIENT)
Age: 69
Discharge: HOME | End: 2019-04-16
Payer: MEDICARE

## 2019-04-16 DIAGNOSIS — E55.9 VITAMIN D DEFICIENCY: ICD-10-CM

## 2019-04-16 DIAGNOSIS — E11.9 CONTROLLED TYPE 2 DIABETES MELLITUS WITHOUT COMPLICATION, WITHOUT LONG-TERM CURRENT USE OF INSULIN (HCC): ICD-10-CM

## 2019-04-16 LAB
ALBUMIN SERPL-MCNC: 4.3 G/DL (ref 3.5–5.2)
ALBUMIN/GLOBULIN RATIO: 1.4 (ref 1–2.5)
ALP BLD-CCNC: 87 U/L (ref 35–104)
ALT SERPL-CCNC: 15 U/L (ref 5–33)
ANION GAP SERPL CALCULATED.3IONS-SCNC: 15 MMOL/L (ref 9–17)
AST SERPL-CCNC: 15 U/L
BILIRUB SERPL-MCNC: 0.28 MG/DL (ref 0.3–1.2)
BUN BLDV-MCNC: 20 MG/DL (ref 8–23)
BUN/CREAT BLD: ABNORMAL (ref 9–20)
CALCIUM SERPL-MCNC: 9.6 MG/DL (ref 8.6–10.4)
CHLORIDE BLD-SCNC: 108 MMOL/L (ref 98–107)
CHOLESTEROL, FASTING: 250 MG/DL
CHOLESTEROL/HDL RATIO: 4.5
CO2: 21 MMOL/L (ref 20–31)
CREAT SERPL-MCNC: 0.58 MG/DL (ref 0.5–0.9)
GFR AFRICAN AMERICAN: >60 ML/MIN
GFR NON-AFRICAN AMERICAN: >60 ML/MIN
GFR SERPL CREATININE-BSD FRML MDRD: ABNORMAL ML/MIN/{1.73_M2}
GFR SERPL CREATININE-BSD FRML MDRD: ABNORMAL ML/MIN/{1.73_M2}
GLUCOSE BLD-MCNC: 116 MG/DL (ref 70–99)
HDLC SERPL-MCNC: 55 MG/DL
LDL CHOLESTEROL: 176 MG/DL (ref 0–130)
POTASSIUM SERPL-SCNC: 4.3 MMOL/L (ref 3.7–5.3)
SODIUM BLD-SCNC: 144 MMOL/L (ref 135–144)
TOTAL PROTEIN: 7.3 G/DL (ref 6.4–8.3)
TRIGLYCERIDE, FASTING: 93 MG/DL
VITAMIN D 25-HYDROXY: 34.8 NG/ML (ref 30–100)
VLDLC SERPL CALC-MCNC: ABNORMAL MG/DL (ref 1–30)

## 2019-04-16 PROCEDURE — 80053 COMPREHEN METABOLIC PANEL: CPT

## 2019-04-16 PROCEDURE — 82306 VITAMIN D 25 HYDROXY: CPT

## 2019-04-16 PROCEDURE — 80061 LIPID PANEL: CPT

## 2019-04-16 PROCEDURE — 36415 COLL VENOUS BLD VENIPUNCTURE: CPT

## 2019-05-08 DIAGNOSIS — E11.9 TYPE 2 DIABETES MELLITUS WITHOUT COMPLICATION, WITHOUT LONG-TERM CURRENT USE OF INSULIN (HCC): ICD-10-CM

## 2019-05-08 RX ORDER — GLIMEPIRIDE 4 MG/1
TABLET ORAL
Qty: 90 TABLET | Refills: 1 | Status: SHIPPED | OUTPATIENT
Start: 2019-05-08 | End: 2019-12-23 | Stop reason: SDUPTHER

## 2019-05-08 NOTE — TELEPHONE ENCOUNTER
Glipizide pending for refill       Health Maintenance   Topic Date Due    Diabetic foot exam  03/03/1962    Diabetic retinal exam  03/03/1962    Breast cancer screen  03/03/2002    Shingles Vaccine (1 of 2) 03/03/2002    Diabetic microalbuminuria test  02/28/2016    DEXA (modify frequency per FRAX score)  03/03/2017    Pneumococcal 65+ years Vaccine (1 of 2 - PCV13) 03/03/2017    Hepatitis C screen  06/15/2019 (Originally 1950)    Colon cancer screen colonoscopy  06/18/2019 (Originally 3/3/2002)    Flu vaccine (Season Ended) 03/25/2020 (Originally 9/1/2019)    A1C test (Diabetic or Prediabetic)  03/25/2020    Lipid screen  04/16/2020    Potassium monitoring  04/16/2020    Creatinine monitoring  04/16/2020             (applicable per patient's age: Cancer Screenings, Depression Screening, Fall Risk Screening, Immunizations)    Hemoglobin A1C (%)   Date Value   03/25/2019 5.6   10/01/2018 5.9   06/18/2018 14.0     Microalb/Crt.  Ratio (mcg/mg creat)   Date Value   02/28/2015 16     LDL Cholesterol (mg/dL)   Date Value   04/16/2019 176 (H)     AST (U/L)   Date Value   04/16/2019 15     ALT (U/L)   Date Value   04/16/2019 15     BUN (mg/dL)   Date Value   04/16/2019 20      (goal A1C is < 7)   (goal LDL is <100) need 30-50% reduction from baseline     BP Readings from Last 3 Encounters:   03/25/19 130/82   10/01/18 124/87   06/18/18 139/88    (goal /80)      All Future Testing planned in [facility]:  Lab Frequency Next Occurrence   [facility]AN[facility] Digital Screen Bilateral [[id number]] Once 04/30/2019   DEXA Bone Density Axial Skeleton Once 04/30/2019   Microalbumin, Ur Once 06/01/2019       Next Visit Date:  Future Appointments   Date Time Provider Shadia Tucker   9/25/2019  1:15 PM GENE De Paz - CNP [facility]            Patient Active Problem List:     Left shoulder pain     Patellar dislocation     Osteoarthritis     HBP (high blood pressure)     Chronic fatigue and immune dysfunction syndrome (HCC)     GERD (gastroesophageal reflux disease)     Family history of premature CAD     History of repair of right rotator cuff     S/P lateral meniscectomy of left knee     Bone, giant cell tumor     CTS (carpal tunnel syndrome)     Anxious depression     Hyperlipidemia     Prediabetes     Nontraumatic rupture of right posterior tibial tendon     Controlled type 2 diabetes mellitus without complication, without long-term current use of insulin ([facility] [address]     Morbid obesity with BMI of 40.0-44.9, adult ([address]

## 2019-05-29 ENCOUNTER — TELEPHONE (OUTPATIENT)
Dept: PHARMACY | Facility: CLINIC | Age: 69
End: 2019-05-29

## 2019-05-29 NOTE — TELEPHONE ENCOUNTER
Jessica Singletary MD - would patient benefit from lipid lowering therapy? Saw that patient has previously trialed atorvastatin and pravastatin and experienced myalgias with therapy. Consider starting patient on ezetimibe 10 mg daily. I can contact patient/family to discuss any changes in therapy. Thank you,  Sheila Ceballos, PharmD  PGY1 Pharmacy Resident  [facility]  Telephone: [phone]. [phone]    ==============================================================  CLINICAL PHARMACY: STATIN REVIEW    SUBJECTIVE:   Identified as DM care gap for [employer]: statin therapy.      OBJECTIVE:  Allergies   Allergen Reactions    Bee Venom Anaphylaxis    Tetanus Toxoids Anaphylaxis    Naproxen Swelling    Ancef [Cefazolin]     Atorvastatin Other (See Comments)     Muscle cramping    Formaldehyde        Medications per current medication list:  Current Outpatient Medications   Medication Sig Dispense Refill    glimepiride (AMARYL) 4 MG tablet TAKE ONE TABLET BY MOUTH EVERY MORNING 90 tablet 1    Multiple Vitamin (MULTI-VITAMIN DAILY PO) Multi Vitamin   DAILY      Cholecalciferol (VITAMIN D3) 5000 units TABS Take by mouth      sertraline (ZOLOFT) 100 MG tablet TAKE ONE TABLET BY MOUTH DAILY 90 tablet 2    triamterene-hydrochlorothiazide (MAXZIDE-25) 37.5-25 MG per tablet TAKE ONE TABLET BY MOUTH DAILY AS NEEDED FOR (EDEMA) 90 tablet 3    potassium chloride (KLOR-CON) 10 MEQ extended release tablet TAKE ONE TABLET BY MOUTH DAILY 90 tablet 3    pantoprazole (PROTONIX) 40 MG tablet TAKE ONE TABLET BY MOUTH DAILY 90 tablet 3    celecoxib (CELEBREX) 200 MG capsule TAKE ONE CAPSULE BY MOUTH TWICE A  capsule 3    metFORMIN (GLUCOPHAGE) 500 MG tablet Take 1 tablet by mouth 2 times daily (with meals) 180 tablet 3    blood glucose monitor strips by Other route daily Test___times daily    Diagnosis: 250.0   Diabetes Mellitus____Insulin Dependent____Non-Insulin Dependent 100 strip 5  Lancets MISC 1 each by Does not apply route daily Use___times daily    Diagnisis:250.0  Diabetes Mellitus____Insulin Dependent___Non-Insulin Dependent 100 each 5    Blood Glucose Monitoring Suppl (BLOOD GLUCOSE METER) KIT Test ___ times daily    Dx: 250.00  Diabetes Mellitus____Insulin Dependent____Non-Insulin Dependent 1 kit 0     No current facility-administered medications for this visit. Labs:  Lab Results   Component Value Date    CHOL 325 (H) 06/19/2018    CHOL 262 (H) 10/27/2015    CHOL 318 (H) 02/28/2015     Lab Results   Component Value Date    TRIG 145 06/19/2018    TRIG 123 10/27/2015    TRIG 140 02/28/2015     Lab Results   Component Value Date    HDL 55 04/16/2019    HDL 47 06/19/2018    HDL 55 10/27/2015     Lab Results   Component Value Date    LDLCHOLESTEROL 176 (H) 04/16/2019    LDLCHOLESTEROL 249 (H) 06/19/2018    LDLCHOLESTEROL 182 (H) 10/27/2015     Lab Results   Component Value Date    VLDL NOT REPORTED 04/16/2019    VLDL NOT REPORTED 06/19/2018    VLDL NOT REPORTED 10/27/2015     Lab Results   Component Value Date    CHOLHDLRATIO 4.5 04/16/2019    CHOLHDLRATIO 6.9 (H) 06/19/2018    CHOLHDLRATIO 4.8 10/27/2015     Lab Results   Component Value Date    ALT 15 04/16/2019        The 10-year ASCVD risk score (Itz Melton, et al., 2013) is: 19.2%    Values used to calculate the score:      Age: 81 years      Sex: Female      Is Non- : No      Diabetic: Yes      Tobacco smoker: No      Systolic Blood Pressure: 845 mmHg      Is BP treated: Yes      HDL Cholesterol: 55 mg/dL      Total Cholesterol: 250 mg/dL      ASSESSMENT:  Hyperlipidemia Goal: Patient has a 10-yr ASCVD risk of >7.5% with DM and is therefore a candidate for moderate-high-intensity statin therapy based on updated guidelines. 2019 ADA Guidelines Age:   [employer]  >/= 36years old:   o History of ASCVD or 10-year ASCVD risk > 20% - high-intensity statin is recommended.      PLAN:  · Patient previously tried atorvastatin and pravastatin therapy and reported myalgias and an overall feeling of unwell   · Based on patient's recent lipid panel, 10-yr ASCVD score, and PMH of DM, lipid lowering therapy is warranted. · Consider initing ezetimibe 10 mg daily for this patient. Thank you,  Sasha Becerra, PharmD  PGY1 Pharmacy Resident  [address] [facility]  Telephone: [phone].  [phone]

## 2019-05-30 NOTE — TELEPHONE ENCOUNTER
Attempted top reach to address initiating ezetimibe therapy and assess patient's readiness to start therapy. Unable to reach patient; left voicemail will callback number. Yaya Cavazos, PharmD  PGY1 Pharmacy Resident  [address] [facility]  Telephone: [phone] ext.  7

## 2019-05-30 NOTE — TELEPHONE ENCOUNTER
Patient confirms allergy to statin therapy and does not wish to explore alternative cholesterol therapy options. Patient also states she is no longer a patient of Dr. Stan Davenport. Epic still lists Dr. Zion Epps as her PCP. Patient is now following with Jourdan Cee CNP. as her PCP. Patient requests that this clinic no longer reach out to her in regards to cholesterol medications.     CLINICAL PHARMACY CONSULT: MED RECONCILIATION/REVIEW ADDENDUM    For Pharmacy Admin Tracking Only    PHSO: Yes  Total # of Interventions Recommended: 1  - New Order #: 1 New Medication Order Reason(s): Needs Additional Medication Therapy  Total Interventions Accepted: 0  Time Spent (min): [address], PharmD  [address]

## 2019-06-28 ENCOUNTER — TELEPHONE (OUTPATIENT)
Dept: PRIMARY CARE CLINIC | Age: 69
End: 2019-06-28

## 2019-06-28 DIAGNOSIS — F41.8 ANXIOUS DEPRESSION: ICD-10-CM

## 2019-06-28 DIAGNOSIS — L23.7 POISON OAK DERMATITIS: Primary | ICD-10-CM

## 2019-06-28 RX ORDER — FLUOCINONIDE 0.5 MG/G
OINTMENT TOPICAL
Qty: 30 G | Refills: 1 | Status: SHIPPED | OUTPATIENT
Start: 2019-06-28 | End: 2019-07-12

## 2019-06-28 RX ORDER — SERTRALINE HYDROCHLORIDE 100 MG/1
100 TABLET, FILM COATED ORAL DAILY
Qty: 90 TABLET | Refills: 1 | Status: SHIPPED | OUTPATIENT
Start: 2019-06-28 | End: 2019-12-23 | Stop reason: SDUPTHER

## 2019-06-28 NOTE — TELEPHONE ENCOUNTER
shoulder pain     Patellar dislocation     Osteoarthritis     HBP (high blood pressure)     Chronic fatigue and immune dysfunction syndrome (HCC)     GERD (gastroesophageal reflux disease)     Family history of premature CAD     History of repair of right rotator cuff     S/P lateral meniscectomy of left knee     Bone, giant cell tumor     CTS (carpal tunnel syndrome)     Anxious depression     Hyperlipidemia     Prediabetes     Nontraumatic rupture of right posterior tibial tendon     Controlled type 2 diabetes mellitus without complication, without long-term current use of insulin ([address]     Morbid obesity with BMI of 40.0-44.9, adult ([address]

## 2019-06-28 NOTE — TELEPHONE ENCOUNTER
Pt is requesting a rx for prednisone since she has had poison ivy for the last week. She has tried OTC medications but they have not provided relief. Health Maintenance   Topic Date Due    Hepatitis C screen  1950    Diabetic foot exam  03/03/1962    Diabetic retinal exam  03/03/1962    Breast cancer screen  03/03/2002    Shingles Vaccine (1 of 2) 03/03/2002    Colon cancer screen colonoscopy  03/03/2002    Annual Wellness Visit (AWV)  03/03/2015    Diabetic microalbuminuria test  02/28/2016    DEXA (modify frequency per FRAX score)  03/03/2017    Pneumococcal 65+ years Vaccine (1 of 2 - PCV13) 03/03/2017    Flu vaccine (Season Ended) 03/25/2020 (Originally 9/1/2019)    A1C test (Diabetic or Prediabetic)  03/25/2020    Lipid screen  04/16/2020    Potassium monitoring  04/16/2020    Creatinine monitoring  04/16/2020             (applicable per patient's age: Cancer Screenings, Depression Screening, Fall Risk Screening, Immunizations)    Hemoglobin A1C (%)   Date Value   03/25/2019 5.6   10/01/2018 5.9   06/18/2018 14.0     Microalb/Crt.  Ratio (mcg/mg creat)   Date Value   02/28/2015 16     LDL Cholesterol (mg/dL)   Date Value   04/16/2019 176 (H)     AST (U/L)   Date Value   04/16/2019 15     ALT (U/L)   Date Value   04/16/2019 15     BUN (mg/dL)   Date Value   04/16/2019 20      (goal A1C is < 7)   (goal LDL is <100) need 30-50% reduction from baseline     BP Readings from Last 3 Encounters:   03/25/19 130/82   10/01/18 124/87   06/18/18 139/88    (goal /80)      All Future Testing planned in [facility]:  Lab Frequency Next Occurrence   JANES Digital Screen Bilateral [BHC1991] Once 10/01/2019   DEXA Bone Density Axial Skeleton Once 10/01/2019   Microalbumin, Ur Once 10/01/2019       Next Visit Date:  Future Appointments   Date Time Provider Shadia Tucker   9/25/2019  1:15 PM Emily Mariscal, APRN - [address]            Patient Active Problem List:     Left shoulder pain     Patellar dislocation     Osteoarthritis     HBP (high blood pressure)     Chronic fatigue and immune dysfunction syndrome (HCC)     GERD (gastroesophageal reflux disease)     Family history of premature CAD     History of repair of right rotator cuff     S/P lateral meniscectomy of left knee     Bone, giant cell tumor     CTS (carpal tunnel syndrome)     Anxious depression     Hyperlipidemia     Prediabetes     Nontraumatic rupture of right posterior tibial tendon     Controlled type 2 diabetes mellitus without complication, without long-term current use of insulin ([address]     Morbid obesity with BMI of 40.0-44.9, adult ([address]

## 2019-07-03 ENCOUNTER — OFFICE VISIT (OUTPATIENT)
Dept: PRIMARY CARE CLINIC | Age: 69
End: 2019-07-03
Payer: MEDICARE

## 2019-07-03 VITALS
DIASTOLIC BLOOD PRESSURE: 88 MMHG | WEIGHT: 255.2 LBS | SYSTOLIC BLOOD PRESSURE: 160 MMHG | BODY MASS INDEX: 42.47 KG/M2 | TEMPERATURE: 98.8 F | HEART RATE: 85 BPM

## 2019-07-03 DIAGNOSIS — L25.5 DERMATITIS DUE TO PLANTS, INCLUDING POISON IVY, SUMAC, AND OAK: Primary | ICD-10-CM

## 2019-07-03 PROCEDURE — 96372 THER/PROPH/DIAG INJ SC/IM: CPT | Performed by: NURSE PRACTITIONER

## 2019-07-03 PROCEDURE — 99202 OFFICE O/P NEW SF 15 MIN: CPT | Performed by: NURSE PRACTITIONER

## 2019-07-03 RX ORDER — CLOBETASOL PROPIONATE 0.5 MG/G
CREAM TOPICAL 2 TIMES DAILY
Qty: 45 G | Refills: 0 | Status: SHIPPED | OUTPATIENT
Start: 2019-07-03 | End: 2019-12-23

## 2019-07-03 RX ORDER — PREDNISONE 20 MG/1
40 TABLET ORAL DAILY
Qty: 10 TABLET | Refills: 0 | Status: SHIPPED | OUTPATIENT
Start: 2019-07-03 | End: 2019-07-08

## 2019-07-03 RX ORDER — METHYLPREDNISOLONE ACETATE 40 MG/ML
40 INJECTION, SUSPENSION INTRA-ARTICULAR; INTRALESIONAL; INTRAMUSCULAR; SOFT TISSUE ONCE
Status: COMPLETED | OUTPATIENT
Start: 2019-07-03 | End: 2019-07-03

## 2019-07-03 RX ADMIN — METHYLPREDNISOLONE ACETATE 40 MG: 40 INJECTION, SUSPENSION INTRA-ARTICULAR; INTRALESIONAL; INTRAMUSCULAR; SOFT TISSUE at 11:57

## 2019-07-03 ASSESSMENT — PATIENT HEALTH QUESTIONNAIRE - PHQ9
SUM OF ALL RESPONSES TO PHQ9 QUESTIONS 1 & 2: 0
2. FEELING DOWN, DEPRESSED OR HOPELESS: 0
1. LITTLE INTEREST OR PLEASURE IN DOING THINGS: 0
SUM OF ALL RESPONSES TO PHQ QUESTIONS 1-9: 0
SUM OF ALL RESPONSES TO PHQ QUESTIONS 1-9: 0

## 2019-07-03 ASSESSMENT — ENCOUNTER SYMPTOMS
VOMITING: 0
SINUS PAIN: 0
COUGH: 0
DIARRHEA: 0
SHORTNESS OF BREATH: 0
NAUSEA: 0
SORE THROAT: 0
ABDOMINAL PAIN: 0

## 2019-07-03 NOTE — PROGRESS NOTES
[facility]  [address]  [address] [facility] [address]  Dept: [phone]  Dept Fax: [phone]    Redd Lund is a 81 y.o. female who presents to the urgent care today for her medicalconditions/complaints as noted below. Redd Lund is c/o of Rash      HPI:       72-year-old female presents with complaint of three-week history of poison ivy. Patient describes that she started with several areas to the left arm, right arm which is now spread to the chest, back. Patient describes areas as raised, red, urticarial with vesicles which have popped. Patient has tried over-the-counter cortisone cream, Benadryl, Ivarest.  Patient was bruised given prescription for a topical steroid which did not provide significant relief. Is a type II diabetic who is on orals with a welcome to manage blood glucose and A1c of 5.9. Relieving factors include none. Worsening factors include none. Past Medical History:   Diagnosis Date    Anxious depression     Hyperlipidemia 1/20/2015    Hypertension     Morbid obesity with BMI of 40.0-44.9, adult ([facility]) 3/25/2019        Current Outpatient Medications   Medication Sig Dispense Refill    predniSONE (DELTASONE) 20 MG tablet Take 2 tablets by mouth daily for 5 days 10 tablet 0    clobetasol prop emollient base (CLOBETASOL PROPIONATE E) 0.05 % CREA Apply topically 2 times daily 45 g 0    sertraline (ZOLOFT) 100 MG tablet Take 1 tablet by mouth daily 90 tablet 1    fluocinonide (LIDEX) 0.05 % ointment Apply topically 2 times daily.  30 g 1    glimepiride (AMARYL) 4 MG tablet TAKE ONE TABLET BY MOUTH EVERY MORNING 90 tablet 1    Multiple Vitamin (MULTI-VITAMIN DAILY PO) Multi Vitamin   DAILY      Cholecalciferol (VITAMIN D3) 5000 units TABS Take by mouth      triamterene-hydrochlorothiazide (MAXZIDE-25) 37.5-25 MG per tablet TAKE ONE TABLET BY MOUTH DAILY AS NEEDED FOR (EDEMA) 90 tablet 3    potassium

## 2019-09-07 DIAGNOSIS — R73.03 PREDIABETES: ICD-10-CM

## 2019-12-23 ENCOUNTER — OFFICE VISIT (OUTPATIENT)
Dept: PRIMARY CARE CLINIC | Age: 69
End: 2019-12-23
Payer: MEDICARE

## 2019-12-23 VITALS
TEMPERATURE: 98.4 F | OXYGEN SATURATION: 95 % | SYSTOLIC BLOOD PRESSURE: 162 MMHG | DIASTOLIC BLOOD PRESSURE: 90 MMHG | BODY MASS INDEX: 44.6 KG/M2 | WEIGHT: 268 LBS | HEART RATE: 92 BPM

## 2019-12-23 DIAGNOSIS — Z12.11 SCREENING FOR COLON CANCER: ICD-10-CM

## 2019-12-23 DIAGNOSIS — M25.562 CHRONIC PAIN OF LEFT KNEE: ICD-10-CM

## 2019-12-23 DIAGNOSIS — Z91.81 AT HIGH RISK FOR FALLS: ICD-10-CM

## 2019-12-23 DIAGNOSIS — R73.03 PREDIABETES: ICD-10-CM

## 2019-12-23 DIAGNOSIS — F41.8 ANXIOUS DEPRESSION: ICD-10-CM

## 2019-12-23 DIAGNOSIS — Z23 NEED FOR PROPHYLACTIC VACCINATION AND INOCULATION AGAINST VARICELLA: ICD-10-CM

## 2019-12-23 DIAGNOSIS — G89.29 CHRONIC PAIN OF LEFT KNEE: ICD-10-CM

## 2019-12-23 DIAGNOSIS — Z78.0 POST-MENOPAUSAL: ICD-10-CM

## 2019-12-23 DIAGNOSIS — Z12.31 ENCOUNTER FOR SCREENING MAMMOGRAM FOR BREAST CANCER: ICD-10-CM

## 2019-12-23 DIAGNOSIS — E11.9 TYPE 2 DIABETES MELLITUS WITHOUT COMPLICATION, WITHOUT LONG-TERM CURRENT USE OF INSULIN (HCC): Primary | ICD-10-CM

## 2019-12-23 PROCEDURE — 99214 OFFICE O/P EST MOD 30 MIN: CPT | Performed by: NURSE PRACTITIONER

## 2019-12-23 RX ORDER — GLIMEPIRIDE 4 MG/1
TABLET ORAL
Qty: 90 TABLET | Refills: 1 | Status: SHIPPED | OUTPATIENT
Start: 2019-12-23 | End: 2020-07-20 | Stop reason: SDUPTHER

## 2019-12-23 RX ORDER — SERTRALINE HYDROCHLORIDE 100 MG/1
100 TABLET, FILM COATED ORAL DAILY
Qty: 90 TABLET | Refills: 1 | Status: SHIPPED | OUTPATIENT
Start: 2019-12-23 | End: 2020-06-25

## 2019-12-23 RX ORDER — CLORAZEPATE DIPOTASSIUM 7.5 MG/1
TABLET ORAL
Qty: 10 TABLET | Refills: 0 | Status: SHIPPED | OUTPATIENT
Start: 2019-12-23 | End: 2020-01-23

## 2019-12-23 ASSESSMENT — ENCOUNTER SYMPTOMS
ABDOMINAL PAIN: 0
NAUSEA: 0
SHORTNESS OF BREATH: 0
COUGH: 0

## 2019-12-30 DIAGNOSIS — R73.9 HYPERGLYCEMIA: ICD-10-CM

## 2019-12-31 RX ORDER — GLUCOSAMINE HCL/CHONDROITIN SU 500-400 MG
CAPSULE ORAL DAILY
Qty: 100 STRIP | Refills: 5 | Status: SHIPPED | OUTPATIENT
Start: 2019-12-31

## 2020-03-26 RX ORDER — POTASSIUM CHLORIDE 750 MG/1
TABLET, FILM COATED, EXTENDED RELEASE ORAL
Qty: 90 TABLET | Refills: 2 | Status: SHIPPED | OUTPATIENT
Start: 2020-03-26 | End: 2020-07-20 | Stop reason: SDUPTHER

## 2020-03-26 RX ORDER — PANTOPRAZOLE SODIUM 40 MG/1
TABLET, DELAYED RELEASE ORAL
Qty: 90 TABLET | Refills: 2 | Status: SHIPPED | OUTPATIENT
Start: 2020-03-26 | End: 2020-07-20 | Stop reason: SDUPTHER

## 2020-06-25 ENCOUNTER — TELEPHONE (OUTPATIENT)
Dept: PRIMARY CARE CLINIC | Age: 70
End: 2020-06-25

## 2020-06-25 DIAGNOSIS — F41.8 ANXIOUS DEPRESSION: ICD-10-CM

## 2020-06-25 DIAGNOSIS — R53.83 FATIGUE, UNSPECIFIED TYPE: Primary | ICD-10-CM

## 2020-06-25 RX ORDER — SERTRALINE HYDROCHLORIDE 100 MG/1
TABLET, FILM COATED ORAL
Qty: 90 TABLET | Refills: 0 | Status: SHIPPED | OUTPATIENT
Start: 2020-06-25 | End: 2020-07-20 | Stop reason: SDUPTHER

## 2020-07-18 ENCOUNTER — HOSPITAL ENCOUNTER (OUTPATIENT)
Age: 70
Discharge: HOME | End: 2020-07-18
Payer: MEDICARE

## 2020-07-18 LAB
ALBUMIN SERPL-MCNC: 4.4 G/DL (ref 3.5–5.2)
ALBUMIN/GLOBULIN RATIO: 1.5 (ref 1–2.5)
ALP BLD-CCNC: 106 U/L (ref 35–104)
ALT SERPL-CCNC: 27 U/L (ref 5–33)
ANION GAP SERPL CALCULATED.3IONS-SCNC: 18 MMOL/L (ref 9–17)
AST SERPL-CCNC: 19 U/L
BILIRUB SERPL-MCNC: 0.39 MG/DL (ref 0.3–1.2)
BUN BLDV-MCNC: 27 MG/DL (ref 8–23)
BUN/CREAT BLD: ABNORMAL (ref 9–20)
CALCIUM SERPL-MCNC: 9.9 MG/DL (ref 8.6–10.4)
CHLORIDE BLD-SCNC: 98 MMOL/L (ref 98–107)
CO2: 19 MMOL/L (ref 20–31)
CREAT SERPL-MCNC: 0.93 MG/DL (ref 0.5–0.9)
CREATININE URINE: 315.1 MG/DL (ref 28–217)
GFR AFRICAN AMERICAN: >60 ML/MIN
GFR NON-AFRICAN AMERICAN: 60 ML/MIN
GFR SERPL CREATININE-BSD FRML MDRD: ABNORMAL ML/MIN/{1.73_M2}
GFR SERPL CREATININE-BSD FRML MDRD: ABNORMAL ML/MIN/{1.73_M2}
GLUCOSE FASTING: 197 MG/DL (ref 70–99)
MICROALBUMIN/CREAT 24H UR: 61 MG/L
MICROALBUMIN/CREAT UR-RTO: 19 MCG/MG CREAT
POTASSIUM SERPL-SCNC: 4.3 MMOL/L (ref 3.7–5.3)
SODIUM BLD-SCNC: 135 MMOL/L (ref 135–144)
TOTAL PROTEIN: 7.4 G/DL (ref 6.4–8.3)
TSH SERPL DL<=0.05 MIU/L-ACNC: 3.01 MIU/L (ref 0.3–5)

## 2020-07-18 PROCEDURE — 36415 COLL VENOUS BLD VENIPUNCTURE: CPT

## 2020-07-18 PROCEDURE — 80053 COMPREHEN METABOLIC PANEL: CPT

## 2020-07-18 PROCEDURE — 82570 ASSAY OF URINE CREATININE: CPT

## 2020-07-18 PROCEDURE — 82043 UR ALBUMIN QUANTITATIVE: CPT

## 2020-07-18 PROCEDURE — 84443 ASSAY THYROID STIM HORMONE: CPT

## 2020-07-20 ENCOUNTER — OFFICE VISIT (OUTPATIENT)
Dept: PRIMARY CARE CLINIC | Age: 70
End: 2020-07-20
Payer: MEDICARE

## 2020-07-20 VITALS
WEIGHT: 265 LBS | SYSTOLIC BLOOD PRESSURE: 132 MMHG | DIASTOLIC BLOOD PRESSURE: 82 MMHG | BODY MASS INDEX: 44.1 KG/M2 | TEMPERATURE: 97.4 F | HEART RATE: 84 BPM | OXYGEN SATURATION: 96 %

## 2020-07-20 LAB — HBA1C MFR BLD: 6.4 %

## 2020-07-20 PROCEDURE — 83036 HEMOGLOBIN GLYCOSYLATED A1C: CPT | Performed by: NURSE PRACTITIONER

## 2020-07-20 PROCEDURE — 99214 OFFICE O/P EST MOD 30 MIN: CPT | Performed by: NURSE PRACTITIONER

## 2020-07-20 RX ORDER — POTASSIUM CHLORIDE 750 MG/1
TABLET, FILM COATED, EXTENDED RELEASE ORAL
Qty: 90 TABLET | Refills: 2 | Status: SHIPPED | OUTPATIENT
Start: 2020-07-20 | End: 2022-01-17

## 2020-07-20 RX ORDER — LISINOPRIL 5 MG/1
5 TABLET ORAL DAILY
Qty: 90 TABLET | Refills: 1 | Status: SHIPPED | OUTPATIENT
Start: 2020-07-20 | End: 2020-07-21

## 2020-07-20 RX ORDER — SERTRALINE HYDROCHLORIDE 100 MG/1
TABLET, FILM COATED ORAL
Qty: 90 TABLET | Refills: 1 | Status: SHIPPED | OUTPATIENT
Start: 2020-07-20 | End: 2022-01-17

## 2020-07-20 RX ORDER — TRIAMTERENE AND HYDROCHLOROTHIAZIDE 37.5; 25 MG/1; MG/1
TABLET ORAL
Qty: 90 TABLET | Refills: 3 | Status: SHIPPED | OUTPATIENT
Start: 2020-07-20 | End: 2022-05-12 | Stop reason: SDUPTHER

## 2020-07-20 RX ORDER — PANTOPRAZOLE SODIUM 40 MG/1
TABLET, DELAYED RELEASE ORAL
Qty: 90 TABLET | Refills: 2 | Status: SHIPPED | OUTPATIENT
Start: 2020-07-20 | End: 2022-05-12 | Stop reason: SDUPTHER

## 2020-07-20 RX ORDER — CELECOXIB 200 MG/1
CAPSULE ORAL
Qty: 180 CAPSULE | Refills: 3 | Status: SHIPPED | OUTPATIENT
Start: 2020-07-20 | End: 2022-01-18 | Stop reason: SDUPTHER

## 2020-07-20 RX ORDER — GLIMEPIRIDE 4 MG/1
TABLET ORAL
Qty: 90 TABLET | Refills: 1 | Status: SHIPPED | OUTPATIENT
Start: 2020-07-20 | End: 2021-02-05

## 2020-07-20 ASSESSMENT — ENCOUNTER SYMPTOMS
NAUSEA: 0
ABDOMINAL PAIN: 0
SHORTNESS OF BREATH: 0
COUGH: 0

## 2020-07-20 NOTE — PROGRESS NOTES
[facility]  [facility] [place] [address]  Dept: [phone]  Dept Fax: [phone]    Patient Care Team:  GENE Bell CNP as PCP - General (Family Medicine)  GENE Bell CNP as PCP - [facility] Empaneled Provider    2020     Moira Chow (:  1950)is a 78 y.o. female, here for evaluation of the following medical concerns:   Chief Complaint   Patient presents with    Fatigue    Other     thinning hair        Feels she has no energy, needs to sit after about 30 minutes or 60 minutes. Going to bed earlier, often wakes up after 2 hours of sleep and will be up for 30 minutes, then will sleep through until 8 AM.   Is napping in the afternoon, started about 4-5 months ago  Not groggy watching television or when driving  \"feels heavy\"  No SOB, no cough, no swollen glands  No increased swelling in legs, taking diuretic daily. Admits FBS are running higher, was eating poorly when pandemic first started. Had not been going to the [facility] or walking like she used to. Does not feel comfortable walking without assistance, looses balance       Diabetes   She presents for her follow-up diabetic visit. She has type 2 diabetes mellitus. Her disease course has been stable. Hypoglycemia symptoms include nervousness/anxiousness. Pertinent negatives for hypoglycemia include no dizziness, headaches, pallor, sweats or tremors. Associated symptoms include fatigue. Pertinent negatives for diabetes include no chest pain, no polydipsia, no polyphagia, no polyuria and no weakness. There are no hypoglycemic complications. Her weight is stable. An ACE inhibitor/angiotensin II receptor blocker is not being taken. Fatigue   This is a recurrent problem. The problem has been unchanged. Associated symptoms include arthralgias and fatigue.  Pertinent negatives include no abdominal pain, chest pain, coughing, headaches, nausea, numbness or weakness. She has tried rest and sleep for the symptoms. .    Review of Systems   Constitutional: Positive for fatigue. Negative for unexpected weight change. Respiratory: Negative for cough and shortness of breath. Cardiovascular: Negative for chest pain and leg swelling. Gastrointestinal: Negative for abdominal pain and nausea. Endocrine: Negative for polydipsia, polyphagia and polyuria. Genitourinary: Negative for difficulty urinating, dysuria, hematuria and vaginal bleeding. Musculoskeletal: Positive for arthralgias. Skin: Negative for pallor. Neurological: Negative for dizziness, tremors, weakness, numbness and headaches. Psychiatric/Behavioral: Positive for sleep disturbance. Negative for dysphoric mood and suicidal ideas. The patient is nervous/anxious. Prior to Visit Medications    Medication Sig Taking?  Authorizing Provider   lisinopril (PRINIVIL;ZESTRIL) 5 MG tablet Take 1 tablet by mouth daily Yes GENE Almonte CNP   glimepiride (AMARYL) 4 MG tablet TAKE ONE TABLET BY MOUTH EVERY MORNING Yes GENE Almonte CNP   metFORMIN (GLUCOPHAGE) 500 MG tablet TAKE ONE TABLET BY MOUTH TWICE A DAY WITH MEALS Yes GENE Almonte CNP   sertraline (ZOLOFT) 100 MG tablet TAKE ONE TABLET BY MOUTH DAILY Yes GENE Almonte CNP   potassium chloride (KLOR-CON) 10 MEQ extended release tablet Take once a day by mouth Yes GENE Almonte CNP   pantoprazole (PROTONIX) 40 MG tablet Take once a day Yes GENE Almonte CNP   triamterene-hydroCHLOROthiazide (MAXZIDE-25) 37.5-25 MG per tablet TAKE ONE TABLET BY MOUTH DAILY AS NEEDED FOR (EDEMA) Yes GENE Almonte CNP   celecoxib (CELEBREX) 200 MG capsule TAKE ONE CAPSULE BY MOUTH TWICE A DAY Yes GENE Almonte CNP   blood glucose monitor strips by Other route daily Test 2 times daily    Diagnosis: 250.0   Diabetes Mellitus____Insulin Dependent____Non-Insulin Dependent Yes Lady Walton Nose: Nose normal.   Eyes:      General: No scleral icterus. Right eye: No discharge. Left eye: No discharge. Conjunctiva/sclera: Conjunctivae normal.      Pupils: Pupils are equal, round, and reactive to light. Neck:      Musculoskeletal: Normal range of motion and neck supple. Thyroid: No thyromegaly. Cardiovascular:      Rate and Rhythm: Normal rate and regular rhythm. Heart sounds: No murmur. Pulmonary:      Effort: Pulmonary effort is normal. No respiratory distress. Breath sounds: Normal breath sounds. No wheezing or rales. Abdominal:      General: There is no distension. Palpations: Abdomen is soft. Lymphadenopathy:      Cervical: No cervical adenopathy. Skin:     General: Skin is warm and dry. Neurological:      Mental Status: She is alert and oriented to person, place, and time. Cranial Nerves: No cranial nerve deficit. Psychiatric:         Behavior: Behavior normal.         Thought Content: Thought content normal.         Judgment: Judgment normal.         ASSESSMENT     Diagnosis Orders   1. Controlled type 2 diabetes mellitus without complication, without long-term current use of insulin ([facility])  POCT glycosylated hemoglobin (Hb A1C)    Lipid, Fasting   2. Chronic fatigue and immune dysfunction syndrome (HCC)  CBC   3. Morbid obesity with BMI of 40.0-44.9, adult ([facility]     4. Familial hypercholesterolemia     5. Type 2 diabetes mellitus without complication, without long-term current use of insulin ([facility])  glimepiride (AMARYL) 4 MG tablet   6. Prediabetes  metFORMIN (GLUCOPHAGE) 500 MG tablet   7. Anxious depression  sertraline (ZOLOFT) 100 MG tablet   8. Essential hypertension  potassium chloride (KLOR-CON) 10 MEQ extended release tablet    triamterene-hydroCHLOROthiazide (MAXZIDE-25) 37.5-25 MG per tablet   9. Gastroesophageal reflux disease without esophagitis  pantoprazole (PROTONIX) 40 MG tablet   10.  Primary osteoarthritis involving multiple following healthy behaviors:exercise and medication adherence    · Discussed use, benefit, and side effects of prescribed medications. Barriers to  medication compliance addressed. All patient questions answered. Pt  verbalized understanding of all instructions given. · Patient given educational materials - see patient instructions      · Patient advised to contact scheduling offices for any referrals or imaging orders  placed today if they have not been contacted in 48 hours. Return in about 4 months (around 11/20/2020) for Diabetes, HTN. An electronic signature was used to authenticate this note.     --GENE Carpenter - CNP on 7/20/2020 at 4:49 PM

## 2020-07-20 NOTE — PROGRESS NOTES
Visit Information    Have you changed or started any medications since your last visit including any over-the-counter medicines, vitamins, or herbal medicines? no   Are you having any side effects from any of your medications? -  no  Have you stopped taking any of your medications? Is so, why? -  no    Have you seen any other physician or provider since your last visit? Yes - Records Requested eye dr  Have you had any other diagnostic tests since your last visit? No  Have you been seen in the emergency room and/or had an admission to a hospital since we last saw you? No  Have you had your routine dental cleaning in the past 6 months? no    Have you activated your Patient Feed account? If not, what are your barriers?  Yes     Patient Care Team:  GENE Monge CNP as PCP - General (Family Medicine)  GENE Monge CNP as PCP - [facility] Provider    Medical History Review  Past Medical, Family, and Social History reviewed and does not contribute to the patient presenting condition    Health Maintenance   Topic Date Due    Hepatitis C screen  1950    Breast cancer screen  03/03/2002    Shingles Vaccine (1 of 2) 03/03/2002    Colon cancer screen colonoscopy  03/03/2002    DEXA (modify frequency per FRAX score)  03/03/2007    Pneumococcal 65+ years Vaccine (1 of 1 - PPSV23) 03/03/2017    Annual Wellness Visit (AWV)  06/18/2019    A1C test (Diabetic or Prediabetic)  03/25/2020    Lipid screen  04/16/2020    Diabetic foot exam  12/23/2020 (Originally 3/3/1962)    Diabetic retinal exam  01/01/2021 (Originally 3/3/1962)    Flu vaccine (1) 09/01/2020    Diabetic microalbuminuria test  07/18/2021    Potassium monitoring  07/18/2021    Creatinine monitoring  07/18/2021    Hepatitis A vaccine  Aged Out    Hib vaccine  Aged Out    Meningococcal (ACWY) vaccine  Aged Out

## 2020-07-21 ENCOUNTER — TELEPHONE (OUTPATIENT)
Dept: PRIMARY CARE CLINIC | Age: 70
End: 2020-07-21

## 2020-07-21 RX ORDER — LISINOPRIL 10 MG/1
5 TABLET ORAL DAILY
Qty: 45 TABLET | Refills: 0 | Status: SHIPPED | OUTPATIENT
Start: 2020-07-21 | End: 2022-05-12 | Stop reason: SINTOL

## 2020-07-21 NOTE — TELEPHONE ENCOUNTER
Patient called, lm said the lisinopril is on back order, it has been for 2 months and [facility] does not know when they will be getting it in stock. They suggested maybe losartan but stated sometimes they even have a hard time getting that in. Please advise      Health Maintenance   Topic Date Due    Hepatitis C screen  1950    Breast cancer screen  03/03/2002    Shingles Vaccine (1 of 2) 03/03/2002    Colon cancer screen colonoscopy  03/03/2002    DEXA (modify frequency per FRAX score)  03/03/2007    Pneumococcal 65+ years Vaccine (1 of 1 - PPSV23) 03/03/2017    Annual Wellness Visit (AWV)  06/18/2019    Lipid screen  04/16/2020    Diabetic foot exam  12/23/2020 (Originally 3/3/1962)    Diabetic retinal exam  01/01/2021 (Originally 3/3/1962)    Flu vaccine (1) 09/01/2020    Diabetic microalbuminuria test  07/18/2021    Potassium monitoring  07/18/2021    Creatinine monitoring  07/18/2021    A1C test (Diabetic or Prediabetic)  07/20/2021    Hepatitis A vaccine  Aged Out    Hib vaccine  Aged Out    Meningococcal (ACWY) vaccine  Aged Out             (applicable per patient's age: Cancer Screenings, Depression Screening, Fall Risk Screening, Immunizations)    Hemoglobin A1C (%)   Date Value   07/20/2020 6.4   03/25/2019 5.6   10/01/2018 5.9     Microalb/Crt.  Ratio (mcg/mg creat)   Date Value   07/18/2020 19     LDL Cholesterol (mg/dL)   Date Value   04/16/2019 176 (H)     AST (U/L)   Date Value   07/18/2020 19     ALT (U/L)   Date Value   07/18/2020 27     BUN (mg/dL)   Date Value   07/18/2020 27 (H)      (goal A1C is < 7)   (goal LDL is <100) need 30-50% reduction from baseline     BP Readings from Last 3 Encounters:   07/20/20 132/82   12/23/19 (!) 162/90   07/03/19 (!) 160/88    (goal /80)      All Future Testing planned in [facility]:  Lab Frequency Next Occurrence   Lipid, Fasting Once 07/20/2020   CBC Once 07/20/2020       Next Visit Date:  Future Appointments   Date Time Provider Department Center   11/20/2020  1:30 PM Jaimie Frost, APRN - [facility]NP [facility] [address]            Patient Active Problem List:     Left shoulder pain     Patellar dislocation     Osteoarthritis     HBP (high blood pressure)     Chronic fatigue and immune dysfunction syndrome (HCC)     GERD (gastroesophageal reflux disease)     Family history of premature CAD     History of repair of right rotator cuff     S/P lateral meniscectomy of left knee     Bone, giant cell tumor     CTS (carpal tunnel syndrome)     Anxious depression     Hyperlipidemia     Nontraumatic rupture of right posterior tibial tendon     Controlled type 2 diabetes mellitus without complication, without long-term current use of insulin ([address]     Morbid obesity with BMI of 40.0-44.9, adult ([address]

## 2020-12-22 ENCOUNTER — TELEPHONE (OUTPATIENT)
Dept: PRIMARY CARE CLINIC | Age: 70
End: 2020-12-22

## 2021-02-05 DIAGNOSIS — E11.9 TYPE 2 DIABETES MELLITUS WITHOUT COMPLICATION, WITHOUT LONG-TERM CURRENT USE OF INSULIN (HCC): ICD-10-CM

## 2021-02-05 RX ORDER — LISINOPRIL 5 MG/1
TABLET ORAL
Qty: 90 TABLET | Refills: 0 | Status: SHIPPED | OUTPATIENT
Start: 2021-02-05 | End: 2022-05-12

## 2021-02-05 RX ORDER — GLIMEPIRIDE 4 MG/1
TABLET ORAL
Qty: 90 TABLET | Refills: 0 | Status: SHIPPED | OUTPATIENT
Start: 2021-02-05 | End: 2022-05-12 | Stop reason: SDUPTHER

## 2021-02-05 NOTE — TELEPHONE ENCOUNTER
Health Maintenance   Topic Date Due    Hepatitis C screen  1950    Diabetic foot exam  03/03/1962    Diabetic retinal exam  03/03/1962    Breast cancer screen  03/03/2002    Shingles Vaccine (1 of 2) 03/03/2002    Colon cancer screen colonoscopy  03/03/2002    DEXA (modify frequency per FRAX score)  03/03/2007    Pneumococcal 65+ years Vaccine (1 of 1 - PPSV23) 03/03/2017    Annual Wellness Visit (AWV)  06/18/2019    Lipid screen  04/16/2020    Flu vaccine (1) 09/01/2020    Diabetic microalbuminuria test  07/18/2021    Potassium monitoring  07/18/2021    Creatinine monitoring  07/18/2021    A1C test (Diabetic or Prediabetic)  07/20/2021    Hepatitis A vaccine  Aged Out    Hib vaccine  Aged Out    Meningococcal (ACWY) vaccine  Aged Out             (applicable per patient's age: Cancer Screenings, Depression Screening, Fall Risk Screening, Immunizations)    Hemoglobin A1C (%)   Date Value   07/20/2020 6.4   03/25/2019 5.6   10/01/2018 5.9     Microalb/Crt. Ratio (mcg/mg creat)   Date Value   07/18/2020 19     LDL Cholesterol (mg/dL)   Date Value   04/16/2019 176 (H)     AST (U/L)   Date Value   07/18/2020 19     ALT (U/L)   Date Value   07/18/2020 27     BUN (mg/dL)   Date Value   07/18/2020 27 (H)      (goal A1C is < 7)   (goal LDL is <100) need 30-50% reduction from baseline     BP Readings from Last 3 Encounters:   07/20/20 132/82   12/23/19 (!) 162/90   07/03/19 (!) 160/88    (goal /80)      All Future Testing planned in [facility]:  Lab Frequency Next Occurrence   Lipid, Fasting Once 10/28/2020   CBC Once 10/28/2020       Next Visit Date:  No future appointments.          Patient Active Problem List:     Left shoulder pain     Patellar dislocation     Osteoarthritis     HBP (high blood pressure)     Chronic fatigue and immune dysfunction syndrome (HCC)     GERD (gastroesophageal reflux disease)     Family history of premature CAD     History of repair of right rotator cuff     S/P lateral meniscectomy of left knee     Bone, giant cell tumor     CTS (carpal tunnel syndrome)     Anxious depression     Hyperlipidemia     Nontraumatic rupture of right posterior tibial tendon     Controlled type 2 diabetes mellitus without complication, without long-term current use of insulin ([facility] [address]     Morbid obesity with BMI of 40.0-44.9, adult ([facility]

## 2021-05-11 ENCOUNTER — TELEPHONE (OUTPATIENT)
Dept: PRIMARY CARE CLINIC | Age: 71
End: 2021-05-11

## 2022-01-16 DIAGNOSIS — I10 ESSENTIAL HYPERTENSION: ICD-10-CM

## 2022-01-16 DIAGNOSIS — F41.8 ANXIOUS DEPRESSION: ICD-10-CM

## 2022-01-17 RX ORDER — POTASSIUM CHLORIDE 750 MG/1
TABLET, FILM COATED, EXTENDED RELEASE ORAL
Qty: 90 TABLET | Refills: 2 | Status: SHIPPED | OUTPATIENT
Start: 2022-01-17 | End: 2022-10-17

## 2022-01-17 RX ORDER — SERTRALINE HYDROCHLORIDE 100 MG/1
TABLET, FILM COATED ORAL
Qty: 90 TABLET | Refills: 1 | Status: SHIPPED | OUTPATIENT
Start: 2022-01-17 | End: 2022-10-28 | Stop reason: SDUPTHER

## 2022-01-17 NOTE — TELEPHONE ENCOUNTER
Next Visit Date:  2/17/2022    Hemoglobin A1C (%)   Date Value   07/20/2020 6.4   03/25/2019 5.6   10/01/2018 5.9             ( goal A1C is < 7)   Microalb/Crt.  Ratio (mcg/mg creat)   Date Value   07/18/2020 19     LDL Cholesterol (mg/dL)   Date Value   04/16/2019 176 (H)       (goal LDL is <100)   AST (U/L)   Date Value   07/18/2020 19     ALT (U/L)   Date Value   07/18/2020 27     BUN (mg/dL)   Date Value   07/18/2020 27 (H)     BP Readings from Last 3 Encounters:   07/20/20 132/82   12/23/19 (!) 162/90   07/03/19 (!) 160/88          (goal 120/80)        Patient Active Problem List:     Left shoulder pain     Patellar dislocation     Osteoarthritis     HBP (high blood pressure)     Chronic fatigue and immune dysfunction syndrome (HCC)     GERD (gastroesophageal reflux disease)     Family history of premature CAD     History of repair of right rotator cuff     S/P lateral meniscectomy of left knee     Bone, giant cell tumor     CTS (carpal tunnel syndrome)     Anxious depression     Hyperlipidemia     Nontraumatic rupture of right posterior tibial tendon     Controlled type 2 diabetes mellitus without complication, without long-term current use of insulin ([address]     Morbid obesity with BMI of 40.0-44.9, adult ([address]      ----Morgan Tucker

## 2022-01-18 DIAGNOSIS — M15.9 PRIMARY OSTEOARTHRITIS INVOLVING MULTIPLE JOINTS: ICD-10-CM

## 2022-01-18 DIAGNOSIS — M25.512 LEFT SHOULDER PAIN, UNSPECIFIED CHRONICITY: ICD-10-CM

## 2022-01-18 RX ORDER — CELECOXIB 200 MG/1
CAPSULE ORAL
Qty: 180 CAPSULE | Refills: 3 | Status: SHIPPED | OUTPATIENT
Start: 2022-01-18 | End: 2022-05-26 | Stop reason: SDUPTHER

## 2022-01-18 NOTE — TELEPHONE ENCOUNTER
----- Message from Rebeca Conteh sent at 1/14/2022  9:17 AM EST -----  Subject: Refill Request    QUESTIONS  Name of Medication? celecoxib (CELEBREX) 200 MG capsule  Patient-reported dosage and instructions? 200MG- TAKE ONE CAPSULE BY MOUTH   TWICE A DAY  How many days do you have left? 10  Preferred Pharmacy? [facility]  Pharmacy phone number (if available)? [phone]  ---------------------------------------------------------------------------  --------------  CALL BACK INFO  What is the best way for the office to contact you? OK to leave message on   voicemail  Preferred Call Back Phone Number?  [phone]

## 2022-05-03 ENCOUNTER — TELEPHONE (OUTPATIENT)
Dept: PRIMARY CARE CLINIC | Age: 72
End: 2022-05-03

## 2022-05-03 NOTE — TELEPHONE ENCOUNTER
----- Message from Ayleen Ambrosio sent at 5/3/2022 12:27 PM EDT -----  Subject: Refill Request    QUESTIONS  Name of Medication? potassium chloride (KLOR-CON) 10 MEQ extended release   tablet  Patient-reported dosage and instructions? TAKE ONE TABLET BY MOUTH DAILY  How many days do you have left? 7  [facility]? [id number]  Pharmacy phone number (if available)? [phone]  ---------------------------------------------------------------------------  --------------  CALL BACK INFO  What is the best way for the office to contact you? OK to leave message on   voicemail, OK to respond with electronic message via General Specific portal (only   for patients who have registered General Specific account)  Preferred Call Back Phone Number? [phone]  ---------------------------------------------------------------------------  --------------  SCRIPT ANSWERS  Relationship to Patient?  Self

## 2022-05-03 NOTE — TELEPHONE ENCOUNTER
----- Message from Júnior Francis sent at 5/3/2022 12:27 PM EDT -----  Subject: Refill Request    QUESTIONS  Name of Medication? potassium chloride (KLOR-CON) 10 MEQ extended release   tablet  Patient-reported dosage and instructions? TAKE ONE TABLET BY MOUTH DAILY  How many days do you have left? 7  Preferred Pharmacy? [id number]  Pharmacy phone number (if available)? [phone]  ---------------------------------------------------------------------------  --------------  CALL BACK INFO  What is the best way for the office to contact you? OK to leave message on   voicemail, OK to respond with electronic message via [employer] portal (only   for patients who have registered [facility] account)  Preferred Call Back Phone Number? [phone]  ---------------------------------------------------------------------------  --------------  SCRIPT ANSWERS  Relationship to Patient?  Self

## 2022-05-04 ENCOUNTER — TELEPHONE (OUTPATIENT)
Dept: PRIMARY CARE CLINIC | Age: 72
End: 2022-05-04

## 2022-05-04 NOTE — TELEPHONE ENCOUNTER
----- Message from Angelika Dennis sent at 2022  3:39 PM EDT -----  Subject: Appointment Request    Reason for Call: New Patient Request Appointment    QUESTIONS  Type of Appointment? New Patient/New to Provider  Reason for appointment request? Available appointments did not meet   patient need  Additional Information for Provider? Patient is previous patient of AMAN Bush and would like to establish care again with immediately as   she will be running out of medications soon. Please contact her asap to   assist in scheduling or advise of first available appt.   ---------------------------------------------------------------------------  --------------  CALL BACK INFO  What is the best way for the office to contact you? OK to leave message on   voicemail  Preferred Call Back Phone Number? [phone]  ---------------------------------------------------------------------------  --------------  SCRIPT ANSWERS  Relationship to Patient? Self  Is this the first appointment to establish care for a ? No  Have you been diagnosed with, awaiting test results for, or told that you   are suspected of having COVID-19 (Coronavirus)? (If patient has tested   negative or was tested as a requirement for work, school, or travel and   not based on symptoms, answer no)? No  Within the past 10 days have you developed any of the following symptoms   (answer no if symptoms have been present longer than 10 days or began   more than 10 days ago)? Fever or Chills, Cough, Shortness of breath or   difficulty breathing, Loss of taste or smell, Sore throat, Nasal   congestion, Sneezing or runny nose, Fatigue or generalized body aches   (answer no if pain is specific to a body part e.g. back pain), Diarrhea,   Headache? No  Have you had close contact with someone with COVID-19 in the last 7 days? No  (Service Expert  click yes below to proceed with [employer] As Usual   Scheduling)?  Yes

## 2022-05-04 NOTE — TELEPHONE ENCOUNTER
Sent in January to [facility]. 90-day supply with 2 refills.   This should last the patient through September

## 2022-05-12 ENCOUNTER — TELEPHONE (OUTPATIENT)
Dept: PRIMARY CARE CLINIC | Age: 72
End: 2022-05-12

## 2022-05-12 ENCOUNTER — OFFICE VISIT (OUTPATIENT)
Dept: PRIMARY CARE CLINIC | Age: 72
End: 2022-05-12
Payer: MEDICARE

## 2022-05-12 VITALS
SYSTOLIC BLOOD PRESSURE: 151 MMHG | TEMPERATURE: 97.9 F | DIASTOLIC BLOOD PRESSURE: 97 MMHG | OXYGEN SATURATION: 96 % | BODY MASS INDEX: 45.6 KG/M2 | WEIGHT: 274 LBS | HEART RATE: 96 BPM

## 2022-05-12 DIAGNOSIS — I10 ESSENTIAL HYPERTENSION: Primary | ICD-10-CM

## 2022-05-12 DIAGNOSIS — E11.9 TYPE 2 DIABETES MELLITUS WITHOUT COMPLICATION, WITHOUT LONG-TERM CURRENT USE OF INSULIN (HCC): ICD-10-CM

## 2022-05-12 DIAGNOSIS — Z53.20 COLON CANCER SCREENING DECLINED: ICD-10-CM

## 2022-05-12 DIAGNOSIS — K21.9 GASTROESOPHAGEAL REFLUX DISEASE WITHOUT ESOPHAGITIS: ICD-10-CM

## 2022-05-12 DIAGNOSIS — Z53.20 MAMMOGRAM DECLINED: ICD-10-CM

## 2022-05-12 PROCEDURE — 99203 OFFICE O/P NEW LOW 30 MIN: CPT | Performed by: NURSE PRACTITIONER

## 2022-05-12 RX ORDER — TRIAMTERENE AND HYDROCHLOROTHIAZIDE 37.5; 25 MG/1; MG/1
TABLET ORAL
Qty: 90 TABLET | Refills: 3 | Status: SHIPPED | OUTPATIENT
Start: 2022-05-12

## 2022-05-12 RX ORDER — GLIMEPIRIDE 4 MG/1
4 TABLET ORAL
Qty: 90 TABLET | Refills: 1 | Status: SHIPPED | OUTPATIENT
Start: 2022-05-12

## 2022-05-12 RX ORDER — PANTOPRAZOLE SODIUM 40 MG/1
TABLET, DELAYED RELEASE ORAL
Qty: 90 TABLET | Refills: 2 | Status: SHIPPED | OUTPATIENT
Start: 2022-05-12

## 2022-05-12 SDOH — ECONOMIC STABILITY: FOOD INSECURITY: WITHIN THE PAST 12 MONTHS, YOU WORRIED THAT YOUR FOOD WOULD RUN OUT BEFORE YOU GOT MONEY TO BUY MORE.: NEVER TRUE

## 2022-05-12 SDOH — ECONOMIC STABILITY: FOOD INSECURITY: WITHIN THE PAST 12 MONTHS, THE FOOD YOU BOUGHT JUST DIDN'T LAST AND YOU DIDN'T HAVE MONEY TO GET MORE.: NEVER TRUE

## 2022-05-12 ASSESSMENT — PATIENT HEALTH QUESTIONNAIRE - PHQ9
1. LITTLE INTEREST OR PLEASURE IN DOING THINGS: 0
2. FEELING DOWN, DEPRESSED OR HOPELESS: 0
7. TROUBLE CONCENTRATING ON THINGS, SUCH AS READING THE NEWSPAPER OR WATCHING TELEVISION: 0
10. IF YOU CHECKED OFF ANY PROBLEMS, HOW DIFFICULT HAVE THESE PROBLEMS MADE IT FOR YOU TO DO YOUR WORK, TAKE CARE OF THINGS AT HOME, OR GET ALONG WITH OTHER PEOPLE: 0
4. FEELING TIRED OR HAVING LITTLE ENERGY: 0
9. THOUGHTS THAT YOU WOULD BE BETTER OFF DEAD, OR OF HURTING YOURSELF: 0
SUM OF ALL RESPONSES TO PHQ9 QUESTIONS 1 & 2: 0
SUM OF ALL RESPONSES TO PHQ QUESTIONS 1-9: 0
SUM OF ALL RESPONSES TO PHQ QUESTIONS 1-9: 0
3. TROUBLE FALLING OR STAYING ASLEEP: 0
6. FEELING BAD ABOUT YOURSELF - OR THAT YOU ARE A FAILURE OR HAVE LET YOURSELF OR YOUR FAMILY DOWN: 0
SUM OF ALL RESPONSES TO PHQ QUESTIONS 1-9: 0
5. POOR APPETITE OR OVEREATING: 0
SUM OF ALL RESPONSES TO PHQ QUESTIONS 1-9: 0
8. MOVING OR SPEAKING SO SLOWLY THAT OTHER PEOPLE COULD HAVE NOTICED. OR THE OPPOSITE, BEING SO FIGETY OR RESTLESS THAT YOU HAVE BEEN MOVING AROUND A LOT MORE THAN USUAL: 0

## 2022-05-12 ASSESSMENT — ENCOUNTER SYMPTOMS
SHORTNESS OF BREATH: 0
ABDOMINAL PAIN: 0
NAUSEA: 0
COUGH: 0
TROUBLE SWALLOWING: 0

## 2022-05-12 ASSESSMENT — SOCIAL DETERMINANTS OF HEALTH (SDOH): HOW HARD IS IT FOR YOU TO PAY FOR THE VERY BASICS LIKE FOOD, HOUSING, MEDICAL CARE, AND HEATING?: NOT HARD AT ALL

## 2022-05-12 NOTE — PROGRESS NOTES
[facility]  [address] [address]  Dept: [phone]  Dept Fax: [phone]    Coco ELLINGTON [address] (:  1950) is a 81 y.o. female,New patient, here for evaluation of the following chief complaint(s):  Medication Refill         ASSESSMENT/PLAN:  1. Essential hypertension  -     triamterene-hydroCHLOROthiazide (MAXZIDE-25) 37.5-25 MG per tablet; TAKE ONE TABLET BY MOUTH DAILY AS NEEDED FOR (EDEMA), Disp-90 tablet, R-3Normal  2. Type 2 diabetes mellitus without complication, without long-term current use of insulin (HCC)  -     glimepiride (AMARYL) 4 MG tablet; Take 1 tablet by mouth every morning (before breakfast), Disp-90 tablet, R-1Normal  3. Gastroesophageal reflux disease without esophagitis  -     pantoprazole (PROTONIX) 40 MG tablet; Take once a day, Disp-90 tablet, R-2Normal  4. Colon cancer screening declined  5. Mammogram declined    Refills provided today. Patient signed release of information for previous PCP to obtain records, including work-up for hemochromatosis. Discussed we can refer to hematology once we have previous records. I explained it is protocol for patients to be evaluated by GI for colon cancer with a history of anemia, Coco continues to decline any cancer screenings    Return in about 3 months (around 2022) for HTN, Diabetes. Subjective   SUBJECTIVE/OBJECTIVE:  Pt is here to establish care, was living in [place] the past 2 years and established care. Seeing Dr Yanira Bran. Since last visit she states she did have an episode of shingles. 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 Also states she was diagnosed with anemia and the hemochromatosis gene. Her last PCP wanted her to see GI, but she declined. Does not understand why she was not referred to hematology.     History of high blood pressure, however she states she will hold her medication if her morning BP is low. She takes triamterene with hydrochlorothiazide in the evening. States she had allergic reaction to lisinopril. Diabetes  She presents for her follow-up diabetic visit. She has type 2 diabetes mellitus. Her disease course has been stable. Pertinent negatives for hypoglycemia include no dizziness, headaches, pallor, sweats or tremors. Associated symptoms include fatigue. Pertinent negatives for diabetes include no chest pain, no polydipsia, no polyphagia, no polyuria and no weakness. There are no hypoglycemic complications. Her weight is stable. An ACE inhibitor/angiotensin II receptor blocker is not being taken. Review of Systems   Constitutional: Positive for fatigue. Negative for unexpected weight change. HENT: Negative for trouble swallowing. Respiratory: Negative for cough and shortness of breath. Cardiovascular: Negative for chest pain and leg swelling. Gastrointestinal: Negative for abdominal pain and nausea. Endocrine: Negative for polydipsia, polyphagia and polyuria. Genitourinary: Negative for difficulty urinating, dysuria, hematuria and vaginal bleeding. Musculoskeletal: Positive for arthralgias. Skin: Negative for pallor. Neurological: Negative for dizziness, tremors, weakness, numbness and headaches. Psychiatric/Behavioral: Positive for sleep disturbance. Negative for dysphoric mood and suicidal ideas.           Objective     DIAGNOSTIC FINDINGS:  CBC:  Lab Results   Component Value Date    WBC 10.6 06/19/2018    HGB 15.6 06/19/2018     06/19/2018       BMP:    Lab Results   Component Value Date     07/18/2020    K 4.3 07/18/2020    CL 98 07/18/2020    CO2 19 07/18/2020    BUN 27 07/18/2020    CREATININE 0.93 07/18/2020    GLUCOSE 116 04/16/2019       HEMOGLOBIN A1C:   Lab Results   Component Value Date    LABA1C 6.4 07/20/2020       FASTING LIPID PANEL:  Lab Results   Component Value Date    CHOL 325 (H) 06/19/2018    HDL 55 04/16/2019    TRIG 145 06/19/2018       Physical Exam  Constitutional:       General: She is not in acute distress. Appearance: She is well-developed. She is morbidly obese. She is not diaphoretic. HENT:      Head: Normocephalic and atraumatic. Nose: Nose normal.   Eyes:      General: No scleral icterus. Right eye: No discharge. Left eye: No discharge. Conjunctiva/sclera: Conjunctivae normal.      Pupils: Pupils are equal, round, and reactive to light. Neck:      Thyroid: No thyromegaly. Cardiovascular:      Rate and Rhythm: Normal rate and regular rhythm. Heart sounds: No murmur heard. Pulmonary:      Effort: Pulmonary effort is normal. No respiratory distress. Breath sounds: Normal breath sounds. No wheezing or rales. Abdominal:      General: There is no distension. Palpations: Abdomen is soft. Musculoskeletal:      Cervical back: Normal range of motion and neck supple. Lymphadenopathy:      Cervical: No cervical adenopathy. Skin:     General: Skin is warm and dry. Neurological:      Mental Status: She is alert and oriented to person, place, and time. Cranial Nerves: No cranial nerve deficit. Psychiatric:         Behavior: Behavior normal.         Thought Content: Thought content normal.         Judgment: Judgment normal.            An electronic signature was used to authenticate this note.     --GENE Fallon - CNP

## 2022-05-12 NOTE — TELEPHONE ENCOUNTER
----- Message from Clara Ernst sent at 5/12/2022 12:50 PM EDT -----  Subject: Message to Provider    QUESTIONS  Information for Provider? PT IS ON HER WAY BILLY BE THERE IN A FEW   MINUTES  ---------------------------------------------------------------------------  --------------  CALL BACK INFO  What is the best way for the office to contact you? OK to leave message on   voicemail  Preferred Call Back Phone Number? [phone]  ---------------------------------------------------------------------------  --------------  SCRIPT ANSWERS  Relationship to Patient?  Self

## 2022-05-26 ENCOUNTER — TELEPHONE (OUTPATIENT)
Dept: PRIMARY CARE CLINIC | Age: 72
End: 2022-05-26

## 2022-05-26 DIAGNOSIS — M15.9 PRIMARY OSTEOARTHRITIS INVOLVING MULTIPLE JOINTS: ICD-10-CM

## 2022-05-26 DIAGNOSIS — M25.512 LEFT SHOULDER PAIN, UNSPECIFIED CHRONICITY: ICD-10-CM

## 2022-05-26 RX ORDER — CELECOXIB 200 MG/1
CAPSULE ORAL
Qty: 180 CAPSULE | Refills: 3 | Status: SHIPPED | OUTPATIENT
Start: 2022-05-26

## 2022-05-26 NOTE — TELEPHONE ENCOUNTER
Patient would like Celbrex sent to [facility] on [address]. Pt never picked up her prescription from 1/18/22. Can pt also have Metformin sent to [facility] on [address] instead of [facility] - because it is free for her a [facility]? Pt has not picked up Metformin prescription from 5/12 that is waiting for her at [facility] yet.

## 2022-08-12 ENCOUNTER — OFFICE VISIT (OUTPATIENT)
Dept: PRIMARY CARE CLINIC | Age: 72
End: 2022-08-12
Payer: MEDICARE

## 2022-08-12 VITALS
HEART RATE: 90 BPM | TEMPERATURE: 98 F | SYSTOLIC BLOOD PRESSURE: 149 MMHG | WEIGHT: 274 LBS | OXYGEN SATURATION: 95 % | BODY MASS INDEX: 45.6 KG/M2 | DIASTOLIC BLOOD PRESSURE: 109 MMHG

## 2022-08-12 DIAGNOSIS — Z13.220 SCREENING FOR HYPERLIPIDEMIA: ICD-10-CM

## 2022-08-12 DIAGNOSIS — R79.89 ELEVATED FERRITIN: ICD-10-CM

## 2022-08-12 DIAGNOSIS — Z12.31 ENCOUNTER FOR SCREENING MAMMOGRAM FOR BREAST CANCER: ICD-10-CM

## 2022-08-12 DIAGNOSIS — E11.9 TYPE 2 DIABETES MELLITUS WITHOUT COMPLICATION, WITHOUT LONG-TERM CURRENT USE OF INSULIN (HCC): Primary | ICD-10-CM

## 2022-08-12 DIAGNOSIS — Z78.0 POST-MENOPAUSAL: ICD-10-CM

## 2022-08-12 LAB — HBA1C MFR BLD: 7.1 %

## 2022-08-12 PROCEDURE — 3051F HG A1C>EQUAL 7.0%<8.0%: CPT | Performed by: NURSE PRACTITIONER

## 2022-08-12 PROCEDURE — 1123F ACP DISCUSS/DSCN MKR DOCD: CPT | Performed by: NURSE PRACTITIONER

## 2022-08-12 PROCEDURE — 83036 HEMOGLOBIN GLYCOSYLATED A1C: CPT | Performed by: NURSE PRACTITIONER

## 2022-08-12 PROCEDURE — 99214 OFFICE O/P EST MOD 30 MIN: CPT | Performed by: NURSE PRACTITIONER

## 2022-08-12 RX ORDER — PEN NEEDLE, DIABETIC 31 G X1/4"
1 NEEDLE, DISPOSABLE MISCELLANEOUS DAILY
Qty: 100 EACH | Refills: 3 | Status: SHIPPED | OUTPATIENT
Start: 2022-08-12

## 2022-08-12 RX ORDER — LIRAGLUTIDE 6 MG/ML
INJECTION SUBCUTANEOUS
Qty: 2 PEN | Refills: 3 | Status: SHIPPED | OUTPATIENT
Start: 2022-08-12 | End: 2022-09-09

## 2022-08-12 ASSESSMENT — ENCOUNTER SYMPTOMS
SHORTNESS OF BREATH: 0
COUGH: 0
TROUBLE SWALLOWING: 0
ABDOMINAL PAIN: 0
NAUSEA: 0

## 2022-08-12 NOTE — PROGRESS NOTES
[facility]  [id number] [facility] [address]  Dept: [phone]  Dept Fax: [phone]    Patient Care Team:  GENE Mejia CNP as PCP - General (Family Medicine)  GENE Mejia CNP as PCP - [facility]    2022     Aurea Guo (:  1950)is a 81 y.o. female, here for evaluation of the following medical concerns:   Chief Complaint   Patient presents with    Hypertension     3M F/U    Diabetes       Aurea Guo presents today for follow-up for hypertension and diabetes. Recent shingles infection. 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 Also states she was diagnosed with anemia and the hemochromatosis gene. Her last PCP wanted her to see GI, but she declined. Does not understand why she was not referred to hematology. Asking today to review results from last PCP. History of high blood pressure, however she states she will hold her medication if her morning BP is low. She takes triamterene with hydrochlorothiazide in the evening. States she had allergic reaction to lisinopril. Blood pressure was low this morning, diastolic in the 77Q and she felt dizzy. Held her pill and will take later today. Previous testing for sleep apnea, denies positive results    Diabetes  She presents for her follow-up diabetic visit. She has type 2 diabetes mellitus. Her disease course has been stable. Pertinent negatives for hypoglycemia include no dizziness, headaches, pallor, sweats or tremors. Associated symptoms include fatigue. Pertinent negatives for diabetes include no chest pain, no polydipsia, no polyphagia, no polyuria and no weakness. There are no hypoglycemic complications. Risk factors for coronary artery disease include obesity, post-menopausal and diabetes mellitus. Her weight is stable.  An ACE inhibitor/angiotensin II receptor blocker is not being taken. .    Review of Systems   Constitutional:  Positive for fatigue. Negative for unexpected weight change. HENT:  Negative for trouble swallowing. Respiratory:  Negative for cough and shortness of breath. Cardiovascular:  Negative for chest pain and leg swelling. Gastrointestinal:  Negative for abdominal pain and nausea. Endocrine: Negative for polydipsia, polyphagia and polyuria. Genitourinary:  Negative for difficulty urinating, dysuria, hematuria and vaginal bleeding. Musculoskeletal:  Positive for arthralgias. Skin:  Negative for pallor. Neurological:  Negative for dizziness, tremors, weakness, numbness and headaches. Psychiatric/Behavioral:  Positive for sleep disturbance. Negative for dysphoric mood and suicidal ideas. Prior to Visit Medications    Medication Sig Taking? Authorizing Provider   Liraglutide (VICTOZA) 18 MG/3ML SOPN SC injection Inject 0.6 mg into the skin daily for 7 days, THEN 1.2 mg daily for 21 days.  Yes GENE Ortez CNP   Insulin Pen Needle ([employer] PEN NEEDLES) 31G X 6 MM MISC 1 each by Does not apply route daily Yes GENE Ortez CNP   celecoxib (CELEBREX) 200 MG capsule TAKE ONE CAPSULE BY MOUTH TWICE A DAY Yes GENE Ortez CNP   metFORMIN (GLUCOPHAGE) 500 MG tablet TAKE ONE TABLET BY MOUTH TWICE A DAY WITH MEALS Yes GENE Ortez CNP   Cyanocobalamin (VITAMIN B12 PO) Take by mouth Yes Historical Provider, MD   pantoprazole (PROTONIX) 40 MG tablet Take once a day Yes GENE Ortez CNP   glimepiride (AMARYL) 4 MG tablet Take 1 tablet by mouth every morning (before breakfast) Yes GENE Ortez CNP   triamterene-hydroCHLOROthiazide (MAXZIDE-25) 37.5-25 MG per tablet TAKE ONE TABLET BY MOUTH DAILY AS NEEDED FOR (EDEMA) Yes GENE Ortez CNP   potassium chloride (KLOR-CON) 10 MEQ extended release tablet TAKE ONE TABLET BY MOUTH DAILY Yes GENE Ortez CNP Component Value Date    CHOL 325 (H) 06/19/2018    HDL 55 04/16/2019    TRIG 145 06/19/2018       Physical Exam  Constitutional:       General: She is not in acute distress. Appearance: She is well-developed. She is morbidly obese. She is not diaphoretic. HENT:      Head: Normocephalic and atraumatic. Nose: Nose normal.   Eyes:      General: No scleral icterus. Right eye: No discharge. Left eye: No discharge. Conjunctiva/sclera: Conjunctivae normal.      Pupils: Pupils are equal, round, and reactive to light. Neck:      Thyroid: No thyromegaly. Cardiovascular:      Rate and Rhythm: Normal rate and regular rhythm. Heart sounds: No murmur heard. Pulmonary:      Effort: Pulmonary effort is normal. No respiratory distress. Breath sounds: Normal breath sounds. No wheezing or rales. Abdominal:      General: There is no distension. Palpations: Abdomen is soft. Musculoskeletal:      Cervical back: Normal range of motion and neck supple. Lymphadenopathy:      Cervical: No cervical adenopathy. Skin:     General: Skin is warm and dry. Neurological:      Mental Status: She is alert and oriented to person, place, and time. Cranial Nerves: No cranial nerve deficit. Psychiatric:         Behavior: Behavior normal.         Thought Content: Thought content normal.         Judgment: Judgment normal.       ASSESSMENT     Diagnosis Orders   1. Type 2 diabetes mellitus without complication, without long-term current use of insulin (formerly [facility])  POCT glycosylated hemoglobin (Hb A1C)    Microalbumin, Ur    Liraglutide (VICTOZA) 18 MG/3ML SOPN SC injection    Insulin Pen Needle ([employer] PEN NEEDLES) 31G X 6 MM MISC      2. Post-menopausal  DEXA Bone Density Axial Skeleton      3. Encounter for screening mammogram for breast cancer  JANES Digital Screen Bilateral      4. Screening for hyperlipidemia  Lipid Panel      5.  Elevated ferritin  Ferritin    CBC with Auto Differential PLAN:  Orders Placed This Encounter   Medications    Liraglutide (VICTOZA) 18 MG/3ML SOPN SC injection     Sig: Inject 0.6 mg into the skin daily for 7 days, THEN 1.2 mg daily for 21 days. Dispense:  2 pen     Refill:  3    Insulin Pen Needle ([employer] PEN NEEDLES) 31G X 6 MM MISC     Si each by Does not apply route daily     Dispense:  100 each     Refill:  3           Screenings due, declines colon cancer screening. Blood pressure is elevated, plans to take medication when she gets home. A1c up above 7, patient was asking about Adipex for weight loss. Reviewed possible side effects, did advise that this provider cannot prescribe Adipex but I often discourage use as it does not provide many long-term weight loss solutions. Agreed to start Victoza once daily today for improved diabetic control as well as weight loss benefit. ADRs reviewed, discussed discontinuing glipizide if any occurrences of hypoglycemia occur  Reviewed records, patient did have elevated ferritin in May 2021. Results report patient has carrier gene for hemochromatosis. Repeat CBC and ferritin today, consider hematology consult    FOLLOW UP AND INSTRUCTIONS:  Return in about 3 months (around 2022) for Diabetes. Coco received counseling on the following healthy behaviors:nutrition and medication adherence    Discussed use, benefit, and side effects of prescribed medications. Barriers to  medication compliance addressed. All patient questions answered. Pt  verbalized understanding of all instructions given. Patient given educational materials - see patient instructions      Patient advised to contact scheduling offices for any referrals or imaging orders  placed today if they have not been contacted in 48 hours. Return in about 3 months (around 2022) for Diabetes. An electronic signature was used to authenticate this note.     --GENE Whitehead - CNP on 2022 at 1:36 PM  Visit Information    Have you changed or started any medications since your last visit including any over-the-counter medicines, vitamins, or herbal medicines? no   Are you having any side effects from any of your medications? -  no  Have you stopped taking any of your medications? Is so, why? -  no    Have you seen any other physician or provider since your last visit? No  Have you had any other diagnostic tests since your last visit? No  Have you been seen in the emergency room and/or had an admission to a hospital since we last saw you? No  Have you had your routine dental cleaning in the past 6 months? no    Have you activated your [employer] account? If not, what are your barriers?  Yes     Patient Care Team:  GENE Chavez CNP as PCP - General (Family Medicine)  GENE Chavez CNP as PCP - [facility] Provider    Medical History Review  Past Medical, Family, and Social History reviewed and does not contribute to the patient presenting condition    Health Maintenance   Topic Date Due    Annual Wellness Visit (AWV)  Never done    Pneumococcal 65+ years Vaccine (1 - PCV) Never done    Diabetic foot exam  Never done    Diabetic retinal exam  Never done    Hepatitis C screen  Never done    Colorectal Cancer Screen  Never done    Breast cancer screen  Never done    DEXA (modify frequency per FRAX score)  Never done    Lipids  04/16/2020    Diabetic microalbuminuria test  07/18/2021    COVID-19 Vaccine (4 - Booster) 04/13/2022    Shingles vaccine (1 of 2) 08/12/2023 (Originally 3/3/2002)    Flu vaccine (1) 09/01/2022    Depression Monitoring  05/12/2023    A1C test (Diabetic or Prediabetic)  08/12/2023    Hepatitis A vaccine  Aged Out    Hib vaccine  Aged Out    Meningococcal (ACWY) vaccine  Aged Out

## 2022-09-12 ENCOUNTER — HOSPITAL ENCOUNTER (OUTPATIENT)
Age: 72
Discharge: HOME | End: 2022-09-12
Payer: MEDICARE

## 2022-09-12 DIAGNOSIS — E11.9 TYPE 2 DIABETES MELLITUS WITHOUT COMPLICATION, WITHOUT LONG-TERM CURRENT USE OF INSULIN (HCC): ICD-10-CM

## 2022-09-12 DIAGNOSIS — R79.89 ELEVATED FERRITIN: ICD-10-CM

## 2022-09-12 DIAGNOSIS — Z13.220 SCREENING FOR HYPERLIPIDEMIA: ICD-10-CM

## 2022-09-12 LAB
ABSOLUTE EOS #: 0.15 K/UL (ref 0–0.44)
ABSOLUTE IMMATURE GRANULOCYTE: 0.04 K/UL (ref 0–0.3)
ABSOLUTE LYMPH #: 3.73 K/UL (ref 1.1–3.7)
ABSOLUTE MONO #: 0.53 K/UL (ref 0.1–1.2)
BASOPHILS # BLD: 1 % (ref 0–2)
BASOPHILS ABSOLUTE: 0.06 K/UL (ref 0–0.2)
CHOLESTEROL/HDL RATIO: 6.9
CHOLESTEROL: 319 MG/DL
EOSINOPHILS RELATIVE PERCENT: 1 % (ref 1–4)
FERRITIN: 468 NG/ML (ref 13–150)
HCT VFR BLD CALC: 48.6 % (ref 36.3–47.1)
HDLC SERPL-MCNC: 46 MG/DL
HEMOGLOBIN: 15.2 G/DL (ref 11.9–15.1)
IMMATURE GRANULOCYTES: 0 %
LDL CHOLESTEROL: 239 MG/DL (ref 0–130)
LYMPHOCYTES # BLD: 33 % (ref 24–43)
MCH RBC QN AUTO: 29.6 PG (ref 25.2–33.5)
MCHC RBC AUTO-ENTMCNC: 31.3 G/DL (ref 28.4–34.8)
MCV RBC AUTO: 94.6 FL (ref 82.6–102.9)
MONOCYTES # BLD: 5 % (ref 3–12)
NRBC AUTOMATED: 0 PER 100 WBC
PDW BLD-RTO: 13.2 % (ref 11.8–14.4)
PLATELET # BLD: 226 K/UL (ref 138–453)
PMV BLD AUTO: 10.1 FL (ref 8.1–13.5)
RBC # BLD: 5.14 M/UL (ref 3.95–5.11)
SEG NEUTROPHILS: 60 % (ref 36–65)
SEGMENTED NEUTROPHILS ABSOLUTE COUNT: 6.94 K/UL (ref 1.5–8.1)
TRIGL SERPL-MCNC: 171 MG/DL
WBC # BLD: 11.5 K/UL (ref 3.5–11.3)

## 2022-09-12 PROCEDURE — 85025 COMPLETE CBC W/AUTO DIFF WBC: CPT

## 2022-09-12 PROCEDURE — 82728 ASSAY OF FERRITIN: CPT

## 2022-09-12 PROCEDURE — 82570 ASSAY OF URINE CREATININE: CPT

## 2022-09-12 PROCEDURE — 82043 UR ALBUMIN QUANTITATIVE: CPT

## 2022-09-12 PROCEDURE — 80061 LIPID PANEL: CPT

## 2022-09-12 PROCEDURE — 36415 COLL VENOUS BLD VENIPUNCTURE: CPT

## 2022-09-14 LAB
CREATININE URINE: 144 MG/DL (ref 28–217)
MICROALBUMIN/CREAT 24H UR: 33 MG/L
MICROALBUMIN/CREAT UR-RTO: 23 MCG/MG CREAT

## 2022-09-17 ENCOUNTER — TELEPHONE (OUTPATIENT)
Dept: PHARMACY | Facility: CLINIC | Age: 72
End: 2022-09-17

## 2022-09-17 NOTE — TELEPHONE ENCOUNTER
POPULATION HEALTH CLINICAL PHARMACY: ADHERENCE REVIEW  Identified care gap per [employer]: fills at [facility]: Diabetes adherence    Last Visit: 09/12/22    Patient also appears to be prescribed: METFORMIN    TAB 500MG     Patient found in Outcomes MTM and is currently eligible for TIP    DIABETES ADHERENCE    IInsurance Records claims through 09/09/22 (Prior Year Samson Deutsch = 0%; YTD Samson Bermanandra = 73%; Potential Fail Date: 09/25/22): METFORMIN    TAB 500MG last filled on 05/12/22 for 90 day supply. Next refill due: 08/10/22    Per evoke Portal:  METFORMIN    TAB 500MG last filled on 08/12/22 for 1800 day supply. Per [facility]:   METFORMIN    TAB 500MG last picked up on 05/18/22 for 90 day supply. 2 refills remaining. Billed through [employer] will process a refill and have it Ready for  tomorrow after 2pm    Lab Results   Component Value Date    LABA1C 7.1 08/12/2022    LABA1C 6.4 07/20/2020    LABA1C 5.6 03/25/2019     NOTE A1c <9%    PLAN  The following are interventions that have been identified:   - Patient overdue refilling metformin and active on home medication list.     Attempting to reach patient to review. Left message asking for return call. Called patient to discuss adherence for metformin.  LF in may  Refill will be ready for p/u on 09/20/22     Future Appointments   Date Time Provider Shadia Tucker   11/14/2022 12:45 PM GENE Barlow - CNP [facility] [address] [place] [address]   [address] [place] [address]  // Department, toll free [phone], Option 2

## 2022-09-22 NOTE — TELEPHONE ENCOUNTER
Per pharmacy. Metformin is still waiting to be picked up       2nd attempt to contact this patient regarding the previous message**    CLINICAL PHARMACY: ADHERENCE REVIEW  Patient unavailable at the time of call. Left following message on home TAD: please call back at toll-free [phone] option 1 to retrieve previous message. Mychart letter sent.     Sincerely,   [address]  // Department, toll free [phone], Option 1      For Pharmacy [address] in place:  No  Recommendation Provided To: Pharmacy: 1  Intervention Detail: Adherence Monitorin  Gap Closed?: No   Intervention Accepted By: Pharmacy: 1  Time Spent (min): 20

## 2022-10-14 DIAGNOSIS — I10 ESSENTIAL HYPERTENSION: ICD-10-CM

## 2022-10-14 NOTE — TELEPHONE ENCOUNTER
Health Maintenance   Topic Date Due    Pneumococcal 65+ years Vaccine (1 - PCV) Never done    Diabetic foot exam  Never done    Diabetic retinal exam  Never done    Hepatitis C screen  Never done    Colorectal Cancer Screen  Never done    Breast cancer screen  Never done    DEXA (modify frequency per FRAX score)  Never done    Annual Wellness Visit (AWV)  Never done    COVID-19 Vaccine (4 - Booster) 04/13/2022    Flu vaccine (1) Never done    Shingles vaccine (1 of 2) 08/12/2023 (Originally 3/3/2002)    Depression Monitoring  05/12/2023    A1C test (Diabetic or Prediabetic)  08/12/2023    Diabetic microalbuminuria test  09/12/2023    Lipids  09/12/2023    Hepatitis A vaccine  Aged Out    Hib vaccine  Aged Out    Meningococcal (ACWY) vaccine  Aged Out             (applicable per patient's age: Cancer Screenings, Depression Screening, Fall Risk Screening, Immunizations)    Hemoglobin A1C (%)   Date Value   08/12/2022 7.1   07/20/2020 6.4   03/25/2019 5.6     Microalb/Crt.  Ratio (mcg/mg creat)   Date Value   09/12/2022 23     LDL Cholesterol (mg/dL)   Date Value   09/12/2022 239 (H)     AST (U/L)   Date Value   07/18/2020 19     ALT (U/L)   Date Value   07/18/2020 27     BUN (mg/dL)   Date Value   07/18/2020 27 (H)      (goal A1C is < 7)   (goal LDL is <100) need 30-50% reduction from baseline     BP Readings from Last 3 Encounters:   08/12/22 (!) 149/109   05/12/22 (!) 151/97   07/20/20 132/82    (goal /80)      All Future Testing planned in CarePATH:  Lab Frequency Next Occurrence   DEXA Bone Density Axial Skeleton Once 09/12/2022   JANES Digital Screen Bilateral Once 08/12/2022       Next Visit Date:  Future Appointments   Date Time Provider Shadia Tucker   11/14/2022 12:45 PM GENE Faustin -  [address]            Patient Active Problem List:     Left shoulder pain     Patellar dislocation     Osteoarthritis     HBP (high blood pressure)     Chronic fatigue and immune dysfunction syndrome (HCC)     GERD (gastroesophageal reflux disease)     Family history of premature CAD     History of repair of right rotator cuff     S/P lateral meniscectomy of left knee     Bone, giant cell tumor     CTS (carpal tunnel syndrome)     Anxious depression     Hyperlipidemia     Nontraumatic rupture of right posterior tibial tendon     Controlled type 2 diabetes mellitus without complication, without long-term current use of insulin ([facility]     Morbid obesity with BMI of 40.0-44.9, adult ([facility]

## 2022-10-17 RX ORDER — POTASSIUM CHLORIDE 750 MG/1
TABLET, FILM COATED, EXTENDED RELEASE ORAL
Qty: 90 TABLET | Refills: 2 | Status: SHIPPED | OUTPATIENT
Start: 2022-10-17

## 2022-10-28 DIAGNOSIS — F41.8 ANXIOUS DEPRESSION: ICD-10-CM

## 2022-10-28 RX ORDER — SERTRALINE HYDROCHLORIDE 100 MG/1
TABLET, FILM COATED ORAL
Qty: 90 TABLET | Refills: 1 | Status: SHIPPED | OUTPATIENT
Start: 2022-10-28

## 2022-10-28 NOTE — TELEPHONE ENCOUNTER
Health Maintenance   Topic Date Due    Pneumococcal 65+ years Vaccine (1 - PCV) Never done    Diabetic foot exam  Never done    Diabetic retinal exam  Never done    Hepatitis C screen  Never done    Colorectal Cancer Screen  Never done    Breast cancer screen  Never done    DEXA (modify frequency per FRAX score)  Never done    Annual Wellness Visit (AWV)  Never done    COVID-19 Vaccine (4 - Booster) 02/07/2022    Flu vaccine (1) Never done    Shingles vaccine (1 of 2) 08/12/2023 (Originally 3/3/2002)    Depression Monitoring  05/12/2023    A1C test (Diabetic or Prediabetic)  08/12/2023    Diabetic microalbuminuria test  09/12/2023    Lipids  09/12/2023    Hepatitis A vaccine  Aged Out    Hib vaccine  Aged Out    Meningococcal (ACWY) vaccine  Aged Out             (applicable per patient's age: Cancer Screenings, Depression Screening, Fall Risk Screening, Immunizations)    Hemoglobin A1C (%)   Date Value   08/12/2022 7.1   07/20/2020 6.4   03/25/2019 5.6     Microalb/Crt.  Ratio (mcg/mg creat)   Date Value   09/12/2022 23     LDL Cholesterol (mg/dL)   Date Value   09/12/2022 239 (H)     AST (U/L)   Date Value   07/18/2020 19     ALT (U/L)   Date Value   07/18/2020 27     BUN (mg/dL)   Date Value   07/18/2020 27 (H)      (goal A1C is < 7)   (goal LDL is <100) need 30-50% reduction from baseline     BP Readings from Last 3 Encounters:   08/12/22 (!) 149/109   05/12/22 (!) 151/97   07/20/20 132/82    (goal /80)      All Future Testing planned in CarePATH:  Lab Frequency Next Occurrence   DEXA Bone Density Axial Skeleton Once 09/12/2022   JANES Digital Screen Bilateral Once 08/12/2022       Next Visit Date:  Future Appointments   Date Time Provider Shadia Tucker   11/14/2022 12:45 PM Ghada Duty, APRN -  [address]            Patient Active Problem List:     Left shoulder pain     Patellar dislocation     Osteoarthritis     HBP (high blood pressure)     Chronic fatigue and immune dysfunction syndrome (HCC)     GERD (gastroesophageal reflux disease)     Family history of premature CAD     History of repair of right rotator cuff     S/P lateral meniscectomy of left knee     Bone, giant cell tumor     CTS (carpal tunnel syndrome)     Anxious depression     Hyperlipidemia     Nontraumatic rupture of right posterior tibial tendon     Controlled type 2 diabetes mellitus without complication, without long-term current use of insulin ([facility] [address]     Morbid obesity with BMI of 40.0-44.9, adult ([facility] [address] [facility]

## 2022-12-22 DIAGNOSIS — E11.9 TYPE 2 DIABETES MELLITUS WITHOUT COMPLICATION, WITHOUT LONG-TERM CURRENT USE OF INSULIN (HCC): ICD-10-CM

## 2022-12-22 RX ORDER — GLIMEPIRIDE 4 MG/1
TABLET ORAL
Qty: 90 TABLET | Refills: 1 | Status: SHIPPED | OUTPATIENT
Start: 2022-12-22

## 2022-12-27 ENCOUNTER — TELEPHONE (OUTPATIENT)
Dept: PHARMACY | Facility: CLINIC | Age: 72
End: 2022-12-27

## 2022-12-27 NOTE — TELEPHONE ENCOUNTER
[facility] HE[facility] CLINICAL PHARMACY: ADHERENCE REVIEW  Identified care gap per [employer]: fills at [facility]: Diabetes adherence    Last Visit: 22        ASSESSMENT  A  DIABETES ADHERENCE    Insurance Records claims through 22 (Prior Year [place] = PASSED; YTD Samson Deutsch =  80%; Potential Fail Date: 22 ):   Glimepiride last filled on 22 for 90 day supply. Next refill due: 22    Per  [facility] Portal:  Same as above     Per [address]:   Glimepiride awaiting       Lab Results   Component Value Date    LABA1C 7.1 2022    LABA1C 6.4 2020    LABA1C 5.6 2019           PLAN  The following are interventions that have been identified:   - Patient overdue refilling glimepiride and active on home medication list.     Spoke with [facility] and medication was sent in on 21 and is still awaiting     LVM for patient letting her know medication is ready for     For Pharmacy Admin Tracking Only    Program: [address]:  No  Recommendation Provided To: Pharmacy: 1  Intervention Detail: Adherence Monitorin  Intervention Accepted By: Pharmacy: 1  Gap Closed?: No   Time Spent (min): 15        No future appointments. [address]    [facility] free: [phone]

## 2023-03-27 DIAGNOSIS — M25.512 LEFT SHOULDER PAIN, UNSPECIFIED CHRONICITY: ICD-10-CM

## 2023-03-27 DIAGNOSIS — M15.9 PRIMARY OSTEOARTHRITIS INVOLVING MULTIPLE JOINTS: ICD-10-CM

## 2023-03-27 NOTE — TELEPHONE ENCOUNTER
Last visit: 8/12/22  Last Med refill: 5/26/22  Does patient have enough medication for 72 hours: No:     Next Visit Date:  No future appointments. Health Maintenance   Topic Date Due    Pneumococcal 65+ years Vaccine (1 - PCV) Never done    Diabetic foot exam  Never done    Diabetic retinal exam  Never done    Hepatitis C screen  Never done    Colorectal Cancer Screen  Never done    Breast cancer screen  Never done    DEXA (modify frequency per FRAX score)  Never done    Annual Wellness Visit (AWV)  Never done    GFR test (Diabetes, CKD 3-4, OR last GFR 15-59)  07/18/2021    COVID-19 Vaccine (4 - Booster) 02/07/2022    Flu vaccine (1) Never done    Shingles vaccine (1 of 2) 08/12/2023 (Originally 3/3/2002)    Depression Monitoring  05/12/2023    A1C test (Diabetic or Prediabetic)  08/12/2023    Diabetic Alb to Cr ratio (uACR) test  09/12/2023    Lipids  09/12/2023    Hepatitis A vaccine  Aged Out    Hib vaccine  Aged Out    Meningococcal (ACWY) vaccine  Aged Out       Hemoglobin A1C (%)   Date Value   08/12/2022 7.1   07/20/2020 6.4   03/25/2019 5.6             ( goal A1C is < 7)   Microalb/Crt.  Ratio (mcg/mg creat)   Date Value   09/12/2022 23     LDL Cholesterol (mg/dL)   Date Value   09/12/2022 239 (H)   04/16/2019 176 (H)       (goal LDL is <100)   AST (U/L)   Date Value   07/18/2020 19     ALT (U/L)   Date Value   07/18/2020 27     BUN (mg/dL)   Date Value   07/18/2020 27 (H)     BP Readings from Last 3 Encounters:   08/12/22 (!) 149/109   05/12/22 (!) 151/97   07/20/20 132/82          (goal 120/80)    All Future Testing planned in Care[facility]  Lab Frequency Next Occurrence   DEXA Bone Density Axial Skeleton Once 09/12/2022   JANES Digital Screen Bilateral Once 08/12/2022               Patient Active Problem List:     Left shoulder pain     Patellar dislocation     Osteoarthritis     HBP (high blood pressure)     Chronic fatigue and immune dysfunction syndrome (HCC)     GERD (gastroesophageal reflux disease)

## 2023-03-28 RX ORDER — CELECOXIB 200 MG/1
CAPSULE ORAL
Qty: 180 CAPSULE | Refills: 0 | Status: SHIPPED | OUTPATIENT
Start: 2023-03-28

## 2023-04-06 ENCOUNTER — OFFICE VISIT (OUTPATIENT)
Dept: PRIMARY CARE CLINIC | Age: 73
End: 2023-04-06

## 2023-04-06 ENCOUNTER — HOSPITAL ENCOUNTER (OUTPATIENT)
Age: 73
Discharge: HOME | End: 2023-04-06
Payer: MEDICARE

## 2023-04-06 VITALS
HEART RATE: 101 BPM | DIASTOLIC BLOOD PRESSURE: 92 MMHG | HEIGHT: 65 IN | OXYGEN SATURATION: 96 % | BODY MASS INDEX: 45.32 KG/M2 | SYSTOLIC BLOOD PRESSURE: 141 MMHG | WEIGHT: 272 LBS

## 2023-04-06 DIAGNOSIS — I10 ESSENTIAL HYPERTENSION: ICD-10-CM

## 2023-04-06 DIAGNOSIS — E11.9 TYPE 2 DIABETES MELLITUS WITHOUT COMPLICATION, WITHOUT LONG-TERM CURRENT USE OF INSULIN (HCC): ICD-10-CM

## 2023-04-06 DIAGNOSIS — G93.32 CHRONIC FATIGUE AND IMMUNE DYSFUNCTION SYNDROME (HCC): ICD-10-CM

## 2023-04-06 DIAGNOSIS — D89.89 CHRONIC FATIGUE AND IMMUNE DYSFUNCTION SYNDROME (HCC): ICD-10-CM

## 2023-04-06 DIAGNOSIS — F41.8 ANXIOUS DEPRESSION: ICD-10-CM

## 2023-04-06 DIAGNOSIS — E11.9 TYPE 2 DIABETES MELLITUS WITHOUT COMPLICATION, WITHOUT LONG-TERM CURRENT USE OF INSULIN (HCC): Primary | ICD-10-CM

## 2023-04-06 DIAGNOSIS — G72.0 STATIN MYOPATHY: ICD-10-CM

## 2023-04-06 DIAGNOSIS — T46.6X5A STATIN MYOPATHY: ICD-10-CM

## 2023-04-06 LAB
ALBUMIN SERPL-MCNC: 4.6 G/DL (ref 3.5–5.2)
ALBUMIN/GLOBULIN RATIO: 1.5 (ref 1–2.5)
ALP SERPL-CCNC: 122 U/L (ref 35–104)
ALT SERPL-CCNC: 28 U/L (ref 5–33)
ANION GAP SERPL CALCULATED.3IONS-SCNC: 20 MMOL/L (ref 9–17)
AST SERPL-CCNC: 21 U/L
BILIRUB SERPL-MCNC: 0.3 MG/DL (ref 0.3–1.2)
BUN SERPL-MCNC: 28 MG/DL (ref 8–23)
CALCIUM SERPL-MCNC: 10.6 MG/DL (ref 8.6–10.4)
CHLORIDE SERPL-SCNC: 103 MMOL/L (ref 98–107)
CO2 SERPL-SCNC: 19 MMOL/L (ref 20–31)
CREAT SERPL-MCNC: 1.24 MG/DL (ref 0.5–0.9)
GFR SERPL CREATININE-BSD FRML MDRD: 47 ML/MIN/1.73M2
GLUCOSE SERPL-MCNC: 144 MG/DL (ref 70–99)
POTASSIUM SERPL-SCNC: 5.1 MMOL/L (ref 3.7–5.3)
PROT SERPL-MCNC: 7.7 G/DL (ref 6.4–8.3)
SODIUM SERPL-SCNC: 142 MMOL/L (ref 135–144)

## 2023-04-06 PROCEDURE — 36415 COLL VENOUS BLD VENIPUNCTURE: CPT

## 2023-04-06 PROCEDURE — 80053 COMPREHEN METABOLIC PANEL: CPT

## 2023-04-06 SDOH — ECONOMIC STABILITY: INCOME INSECURITY: HOW HARD IS IT FOR YOU TO PAY FOR THE VERY BASICS LIKE FOOD, HOUSING, MEDICAL CARE, AND HEATING?: NOT HARD AT ALL

## 2023-04-06 SDOH — ECONOMIC STABILITY: HOUSING INSECURITY
IN THE LAST 12 MONTHS, WAS THERE A TIME WHEN YOU DID NOT HAVE A STEADY PLACE TO SLEEP OR SLEPT IN A SHELTER (INCLUDING NOW)?: NO

## 2023-04-06 SDOH — ECONOMIC STABILITY: FOOD INSECURITY: WITHIN THE PAST 12 MONTHS, THE FOOD YOU BOUGHT JUST DIDN'T LAST AND YOU DIDN'T HAVE MONEY TO GET MORE.: NEVER TRUE

## 2023-04-06 SDOH — ECONOMIC STABILITY: FOOD INSECURITY: WITHIN THE PAST 12 MONTHS, YOU WORRIED THAT YOUR FOOD WOULD RUN OUT BEFORE YOU GOT MONEY TO BUY MORE.: NEVER TRUE

## 2023-04-06 ASSESSMENT — ENCOUNTER SYMPTOMS
TROUBLE SWALLOWING: 0
SHORTNESS OF BREATH: 0
ABDOMINAL PAIN: 0
CHEST TIGHTNESS: 0
COUGH: 0
NAUSEA: 0

## 2023-04-06 ASSESSMENT — PATIENT HEALTH QUESTIONNAIRE - PHQ9
5. POOR APPETITE OR OVEREATING: 0
2. FEELING DOWN, DEPRESSED OR HOPELESS: 0
9. THOUGHTS THAT YOU WOULD BE BETTER OFF DEAD, OR OF HURTING YOURSELF: 0
SUM OF ALL RESPONSES TO PHQ QUESTIONS 1-9: 0
SUM OF ALL RESPONSES TO PHQ9 QUESTIONS 1 & 2: 0
7. TROUBLE CONCENTRATING ON THINGS, SUCH AS READING THE NEWSPAPER OR WATCHING TELEVISION: 0
8. MOVING OR SPEAKING SO SLOWLY THAT OTHER PEOPLE COULD HAVE NOTICED. OR THE OPPOSITE, BEING SO FIGETY OR RESTLESS THAT YOU HAVE BEEN MOVING AROUND A LOT MORE THAN USUAL: 0
4. FEELING TIRED OR HAVING LITTLE ENERGY: 0
6. FEELING BAD ABOUT YOURSELF - OR THAT YOU ARE A FAILURE OR HAVE LET YOURSELF OR YOUR FAMILY DOWN: 0
SUM OF ALL RESPONSES TO PHQ QUESTIONS 1-9: 0
SUM OF ALL RESPONSES TO PHQ QUESTIONS 1-9: 0
1. LITTLE INTEREST OR PLEASURE IN DOING THINGS: 0
10. IF YOU CHECKED OFF ANY PROBLEMS, HOW DIFFICULT HAVE THESE PROBLEMS MADE IT FOR YOU TO DO YOUR WORK, TAKE CARE OF THINGS AT HOME, OR GET ALONG WITH OTHER PEOPLE: 0
SUM OF ALL RESPONSES TO PHQ QUESTIONS 1-9: 0
3. TROUBLE FALLING OR STAYING ASLEEP: 0

## 2023-04-06 NOTE — PROGRESS NOTES
stable. An ACE inhibitor/angiotensin II receptor blocker is not being taken. Review of Systems   Constitutional:  Positive for fatigue. Negative for unexpected weight change. HENT:  Negative for trouble swallowing. Respiratory:  Negative for cough, chest tightness and shortness of breath. Cardiovascular:  Negative for chest pain and leg swelling. Gastrointestinal:  Negative for abdominal pain and nausea. Endocrine: Negative for polydipsia, polyphagia and polyuria. Genitourinary:  Negative for difficulty urinating, dysuria, hematuria, vaginal bleeding and vaginal discharge. Musculoskeletal:  Positive for arthralgias. Skin:  Negative for pallor. Neurological:  Negative for dizziness, tremors, weakness, numbness and headaches. Psychiatric/Behavioral:  Positive for sleep disturbance. Negative for dysphoric mood and suicidal ideas. Objective     DIAGNOSTIC FINDINGS:  CBC:  Lab Results   Component Value Date/Time    WBC 11.5 09/12/2022 11:55 AM    HGB 15.2 09/12/2022 11:55 AM     09/12/2022 11:55 AM       BMP:    Lab Results   Component Value Date/Time     04/06/2023 01:45 PM    K 5.1 04/06/2023 01:45 PM     04/06/2023 01:45 PM    CO2 19 04/06/2023 01:45 PM    BUN 28 04/06/2023 01:45 PM    CREATININE 1.24 04/06/2023 01:45 PM    GLUCOSE 144 04/06/2023 01:45 PM       HEMOGLOBIN A1C:   Lab Results   Component Value Date/Time    LABA1C 7.1 08/12/2022 12:55 PM       FASTING LIPID PANEL:  Lab Results   Component Value Date    CHOL 319 (H) 09/12/2022    HDL 46 09/12/2022    TRIG 171 (H) 09/12/2022       Physical Exam  Constitutional:       General: She is not in acute distress. Appearance: She is well-developed. She is obese. She is not diaphoretic. HENT:      Head: Normocephalic and atraumatic. Nose: Nose normal.   Eyes:      General: No scleral icterus. Right eye: No discharge. Left eye: No discharge.       Conjunctiva/sclera: Conjunctivae normal.

## 2023-05-13 DIAGNOSIS — F41.8 ANXIOUS DEPRESSION: ICD-10-CM

## 2023-05-15 RX ORDER — SERTRALINE HYDROCHLORIDE 100 MG/1
TABLET, FILM COATED ORAL
Qty: 90 TABLET | Refills: 1 | Status: SHIPPED | OUTPATIENT
Start: 2023-05-15

## 2023-05-15 NOTE — TELEPHONE ENCOUNTER
Last visit: 4/6/23  Last Med refill: 10/28/22  Does patient have enough medication for 72 hours:     Next Visit Date:  Future Appointments   Date Time Provider Shadia Tucker   10/10/2023  2:15 PM GENE [facility] Maintenance   Topic Date Due    Pneumococcal 65+ years Vaccine (1 - PCV) Never done    Diabetic foot exam  Never done    Diabetic retinal exam  Never done    Hepatitis C screen  Never done    Colorectal Cancer Screen  Never done    Breast cancer screen  Never done    DEXA (modify frequency per FRAX score)  Never done    Annual Wellness Visit (AWV)  Never done    COVID-19 Vaccine (4 - Booster) 02/07/2022    Shingles vaccine (1 of 2) 08/12/2023 (Originally 3/3/2002)    Flu vaccine (Season Ended) 08/01/2023    A1C test (Diabetic or Prediabetic)  08/12/2023    Diabetic Alb to Cr ratio (uACR) test  09/12/2023    Lipids  09/12/2023    Depression Monitoring  04/06/2024    GFR test (Diabetes, CKD 3-4, OR last GFR 15-59)  04/06/2024    Hepatitis A vaccine  Aged Out    Hib vaccine  Aged Out    Meningococcal (ACWY) vaccine  Aged Out       Hemoglobin A1C (%)   Date Value   08/12/2022 7.1   07/20/2020 6.4   03/25/2019 5.6             ( goal A1C is < 7)   Microalb/Crt.  Ratio (mcg/mg creat)   Date Value   09/12/2022 23     LDL Cholesterol (mg/dL)   Date Value   09/12/2022 239 (H)   04/16/2019 176 (H)       (goal LDL is <100)   AST (U/L)   Date Value   04/06/2023 21     ALT (U/L)   Date Value   04/06/2023 28     BUN (mg/dL)   Date Value   04/06/2023 28 (H)     BP Readings from Last 3 Encounters:   04/06/23 (!) 141/92   08/12/22 (!) 149/109   05/12/22 (!) 151/97          (goal 120/80)    All Future Testing planned in CarePATH  Lab Frequency Next Occurrence   DEXA Bone Density Axial Skeleton Once 09/12/2022   JANES Digital Screen Bilateral Once 08/12/2022               Patient Active Problem List:     Left shoulder pain     Patellar dislocation     Osteoarthritis     HBP (high blood

## 2023-06-24 DIAGNOSIS — M15.9 PRIMARY OSTEOARTHRITIS INVOLVING MULTIPLE JOINTS: ICD-10-CM

## 2023-06-24 DIAGNOSIS — M25.512 LEFT SHOULDER PAIN, UNSPECIFIED CHRONICITY: ICD-10-CM

## 2023-06-25 DIAGNOSIS — E11.9 TYPE 2 DIABETES MELLITUS WITHOUT COMPLICATION, WITHOUT LONG-TERM CURRENT USE OF INSULIN (HCC): ICD-10-CM

## 2023-06-26 RX ORDER — GLIMEPIRIDE 4 MG/1
TABLET ORAL
Qty: 90 TABLET | Refills: 1 | Status: SHIPPED | OUTPATIENT
Start: 2023-06-26

## 2023-06-26 RX ORDER — CELECOXIB 200 MG/1
CAPSULE ORAL
Qty: 180 CAPSULE | Refills: 0 | Status: SHIPPED | OUTPATIENT
Start: 2023-06-26

## 2023-06-26 RX ORDER — CELECOXIB 200 MG/1
CAPSULE ORAL
Qty: 180 CAPSULE | Refills: 0 | OUTPATIENT
Start: 2023-06-26

## 2023-07-24 ENCOUNTER — TELEPHONE (OUTPATIENT)
Dept: PRIMARY CARE CLINIC | Age: 73
End: 2023-07-24

## 2023-07-24 NOTE — TELEPHONE ENCOUNTER
Patient called stating she has jury duty 8/16 and  8/17. She has a paper to get exempted from attending. She  was wondering does she need to come in for an appointment or can she bring forms to fill out due to her disability. Injury to knee and unable to walk long distances.

## 2023-08-10 ENCOUNTER — OFFICE VISIT (OUTPATIENT)
Dept: PRIMARY CARE CLINIC | Age: 73
End: 2023-08-10
Payer: MEDICARE

## 2023-08-10 VITALS
WEIGHT: 271.4 LBS | OXYGEN SATURATION: 93 % | SYSTOLIC BLOOD PRESSURE: 152 MMHG | DIASTOLIC BLOOD PRESSURE: 93 MMHG | BODY MASS INDEX: 45.16 KG/M2 | HEART RATE: 108 BPM

## 2023-08-10 DIAGNOSIS — M15.9 PRIMARY OSTEOARTHRITIS INVOLVING MULTIPLE JOINTS: Primary | ICD-10-CM

## 2023-08-10 DIAGNOSIS — F41.8 ANXIOUS DEPRESSION: ICD-10-CM

## 2023-08-10 PROCEDURE — 1123F ACP DISCUSS/DSCN MKR DOCD: CPT | Performed by: NURSE PRACTITIONER

## 2023-08-10 PROCEDURE — 3077F SYST BP >= 140 MM HG: CPT | Performed by: NURSE PRACTITIONER

## 2023-08-10 PROCEDURE — 3080F DIAST BP >= 90 MM HG: CPT | Performed by: NURSE PRACTITIONER

## 2023-08-10 PROCEDURE — 99214 OFFICE O/P EST MOD 30 MIN: CPT | Performed by: NURSE PRACTITIONER

## 2023-08-10 RX ORDER — CLORAZEPATE DIPOTASSIUM 7.5 MG/1
7.5 TABLET ORAL DAILY PRN
Qty: 10 TABLET | Refills: 0 | Status: SHIPPED | OUTPATIENT
Start: 2023-08-10 | End: 2023-09-09

## 2023-08-10 ASSESSMENT — ENCOUNTER SYMPTOMS: BACK PAIN: 0
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
## 2023-10-01 DIAGNOSIS — M15.9 PRIMARY OSTEOARTHRITIS INVOLVING MULTIPLE JOINTS: ICD-10-CM

## 2023-10-01 DIAGNOSIS — M25.512 LEFT SHOULDER PAIN, UNSPECIFIED CHRONICITY: ICD-10-CM

## 2023-10-02 RX ORDER — CELECOXIB 200 MG/1
CAPSULE ORAL
Qty: 180 CAPSULE | Refills: 0 | Status: SHIPPED | OUTPATIENT
Start: 2023-10-02

## 2023-10-16 ENCOUNTER — OFFICE VISIT (OUTPATIENT)
Dept: PRIMARY CARE CLINIC | Age: 73
End: 2023-10-16
Payer: MEDICARE

## 2023-10-16 VITALS
HEART RATE: 76 BPM | DIASTOLIC BLOOD PRESSURE: 98 MMHG | WEIGHT: 280.2 LBS | BODY MASS INDEX: 46.63 KG/M2 | OXYGEN SATURATION: 96 % | SYSTOLIC BLOOD PRESSURE: 159 MMHG

## 2023-10-16 DIAGNOSIS — G72.0 STATIN MYOPATHY: ICD-10-CM

## 2023-10-16 DIAGNOSIS — F41.8 ANXIOUS DEPRESSION: ICD-10-CM

## 2023-10-16 DIAGNOSIS — T46.6X5A STATIN MYOPATHY: ICD-10-CM

## 2023-10-16 DIAGNOSIS — E11.9 TYPE 2 DIABETES MELLITUS WITHOUT COMPLICATION, WITHOUT LONG-TERM CURRENT USE OF INSULIN (HCC): Primary | ICD-10-CM

## 2023-10-16 DIAGNOSIS — I10 ESSENTIAL HYPERTENSION: ICD-10-CM

## 2023-10-16 PROBLEM — S83.242A OTHER TEAR OF MEDIAL MENISCUS, CURRENT INJURY, LEFT KNEE, INITIAL ENCOUNTER: Status: ACTIVE | Noted: 2017-12-06

## 2023-10-16 PROBLEM — M17.12 UNILATERAL PRIMARY OSTEOARTHRITIS, LEFT KNEE: Status: ACTIVE | Noted: 2017-12-06

## 2023-10-16 LAB — HBA1C MFR BLD: 7.6 %

## 2023-10-16 PROCEDURE — G8417 CALC BMI ABV UP PARAM F/U: HCPCS | Performed by: NURSE PRACTITIONER

## 2023-10-16 PROCEDURE — 83036 HEMOGLOBIN GLYCOSYLATED A1C: CPT | Performed by: NURSE PRACTITIONER

## 2023-10-16 PROCEDURE — 1123F ACP DISCUSS/DSCN MKR DOCD: CPT | Performed by: NURSE PRACTITIONER

## 2023-10-16 PROCEDURE — 3017F COLORECTAL CA SCREEN DOC REV: CPT | Performed by: NURSE PRACTITIONER

## 2023-10-16 PROCEDURE — G8427 DOCREV CUR MEDS BY ELIG CLIN: HCPCS | Performed by: NURSE PRACTITIONER

## 2023-10-16 PROCEDURE — 99214 OFFICE O/P EST MOD 30 MIN: CPT | Performed by: NURSE PRACTITIONER

## 2023-10-16 PROCEDURE — G8484 FLU IMMUNIZE NO ADMIN: HCPCS | Performed by: NURSE PRACTITIONER

## 2023-10-16 PROCEDURE — 3077F SYST BP >= 140 MM HG: CPT | Performed by: NURSE PRACTITIONER

## 2023-10-16 PROCEDURE — 1090F PRES/ABSN URINE INCON ASSESS: CPT | Performed by: NURSE PRACTITIONER

## 2023-10-16 PROCEDURE — 3051F HG A1C>EQUAL 7.0%<8.0%: CPT | Performed by: NURSE PRACTITIONER

## 2023-10-16 PROCEDURE — 1036F TOBACCO NON-USER: CPT | Performed by: NURSE PRACTITIONER

## 2023-10-16 PROCEDURE — 3080F DIAST BP >= 90 MM HG: CPT | Performed by: NURSE PRACTITIONER

## 2023-10-16 PROCEDURE — G8400 PT W/DXA NO RESULTS DOC: HCPCS | Performed by: NURSE PRACTITIONER

## 2023-10-16 PROCEDURE — 2022F DILAT RTA XM EVC RTNOPTHY: CPT | Performed by: NURSE PRACTITIONER

## 2023-10-16 RX ORDER — LOSARTAN POTASSIUM 50 MG/1
50 TABLET ORAL DAILY
Qty: 90 TABLET | Refills: 1 | Status: SHIPPED | OUTPATIENT
Start: 2023-10-16

## 2023-10-16 NOTE — PROGRESS NOTES
[address] [facility] PRIMARY CARE  [id number] [facility] [address] [facility] [address]  Dept: [phone]  Dept Fax: [phone]    Coco Reina [facility] (:  1950) is a 72 y.o. female,Established patient, here for evaluation of the following chief complaint(s):  Diabetes, Hypertension, and Follow-up (Patient is here today for  a follow up on HTN and diabetes )         ASSESSMENT/PLAN:  1. Type 2 diabetes mellitus without complication, without long-term current use of insulin (HCC)  -     POCT glycosylated hemoglobin (Hb A1C)  -     losartan (COZAAR) 50 MG tablet; Take 1 tablet by mouth daily, Disp-90 tablet, R-1Normal  -     metFORMIN (GLUCOPHAGE) 500 MG tablet; Take 2 tablets by mouth daily (with breakfast) AND 1 tablet Daily with supper. TAKE 1 TABLET BY MOUTH TWICE A DAY WITH MEALS., Disp-270 tablet, R-0Normal  -     Microalbumin, Ur; Future  -     Lipid, Fasting; Future  2. Anxious depression  3. Body mass index (BMI) 45.0-49.9, adult ([facility])  4. Essential hypertension  -     losartan (COZAAR) 50 MG tablet; Take 1 tablet by mouth daily, Disp-90 tablet, R-1Normal  5. Statin myopathy    A1c up to 7.6, patient agreeable to increasing metformin to 1000 in the a.m. and to continue 500 mg in the evening  Blood pressure remains elevated, previous allergy to lisinopril. Agreed to start losartan today given comorbid diabetes. Labs ordered. Statin intolerance, heavily encouraged to avoid processed foods. Return in about 4 months (around 2024). Subjective   SUBJECTIVE/OBJECTIVE:  Coco Strong presents today for follow-up for hypertension and diabetes. History of high blood pressure, however she states she will hold her medication if her morning BP is low. She takes triamterene with hydrochlorothiazide in the evening, but not routinely. Uses it mainly for edema. States she had allergic reaction to lisinopril.       Previous testing for sleep apnea,

## 2023-12-11 ENCOUNTER — TELEPHONE (OUTPATIENT)
Dept: PRIMARY CARE CLINIC | Age: 73
End: 2023-12-11

## 2023-12-11 DIAGNOSIS — E11.9 TYPE 2 DIABETES MELLITUS WITHOUT COMPLICATION, WITHOUT LONG-TERM CURRENT USE OF INSULIN (HCC): ICD-10-CM

## 2023-12-11 NOTE — TELEPHONE ENCOUNTER
The patient is to take 3 total tablets today, 2 in the AM and 1 in the evening.   I will revise the prescription of her appropriate directions

## 2023-12-11 NOTE — TELEPHONE ENCOUNTER
[facility] called on metformin script. They are asking if pt should be taking as 3 times a day or 2 times a day. SIG had two notes inside so they wanted to clarify.

## 2024-01-06 DIAGNOSIS — M25.512 LEFT SHOULDER PAIN, UNSPECIFIED CHRONICITY: ICD-10-CM

## 2024-01-06 DIAGNOSIS — E11.9 TYPE 2 DIABETES MELLITUS WITHOUT COMPLICATION, WITHOUT LONG-TERM CURRENT USE OF INSULIN (HCC): ICD-10-CM

## 2024-01-06 DIAGNOSIS — I10 ESSENTIAL HYPERTENSION: ICD-10-CM

## 2024-01-06 DIAGNOSIS — M15.9 PRIMARY OSTEOARTHRITIS INVOLVING MULTIPLE JOINTS: ICD-10-CM

## 2024-01-06 DIAGNOSIS — F41.8 ANXIOUS DEPRESSION: ICD-10-CM

## 2024-01-08 RX ORDER — GLIMEPIRIDE 4 MG/1
TABLET ORAL
Qty: 90 TABLET | Refills: 1 | Status: SHIPPED | OUTPATIENT
Start: 2024-01-08

## 2024-01-08 RX ORDER — CELECOXIB 200 MG/1
CAPSULE ORAL
Qty: 180 CAPSULE | Refills: 0 | Status: SHIPPED | OUTPATIENT
Start: 2024-01-08

## 2024-01-08 RX ORDER — POTASSIUM CHLORIDE 750 MG/1
TABLET, FILM COATED, EXTENDED RELEASE ORAL
Qty: 90 TABLET | Refills: 2 | Status: SHIPPED | OUTPATIENT
Start: 2024-01-08

## 2024-01-08 RX ORDER — SERTRALINE HYDROCHLORIDE 100 MG/1
TABLET, FILM COATED ORAL
Qty: 90 TABLET | Refills: 1 | Status: SHIPPED | OUTPATIENT
Start: 2024-01-08

## 2024-02-05 ENCOUNTER — ENROLLMENT (OUTPATIENT)
Dept: PHARMACY | Facility: CLINIC | Age: 74
End: 2024-02-05

## 2024-02-15 ENCOUNTER — TELEPHONE (OUTPATIENT)
Dept: PHARMACY | Facility: CLINIC | Age: 74
End: 2024-02-15

## 2024-02-15 NOTE — TELEPHONE ENCOUNTER
Brii Camejo, APRN - CNP, from below, appointment with you 2/19/24 - identified with a care gap for diabetes without a statin  Per patient chart review: Patient has a listed allergy to statins. Unfortunately, allergies are not billed and a CMS approved dx code must be entered on a billable office visit in order for this patient to be excluded. - 4/6/23 PCP OV: Statin myopathy (G72.0, T46.6X5A) dx used    If appropriate, please consider using the following CMS eligible diagnosis within your visit encounter:  Statin myopathy (G72.0)  - This will complete/close this insurer-identified gap for this calendar year.    Please let me know if I can further assist, or if you would like me to try to reach patient to discuss any further.  Thank you,  Avani Dumont, PharmD, [facility]  Population Health Pharmacist  [facility]  Department, toll free: [phone], option 1    ==============================================================    [facility] CLINICAL PHARMACY: STATIN THERAPY REVIEW  Identified statin use in persons with diabetes care gap per [employer] (2023)    ASSESSMENT  DIABETES ADHERENCE    Insurance Records claims through  2023  (2023 PDC = 97% - PASSED)    Lab Results   Component Value Date    LABA1C 7.6 10/16/2023    LABA1C 7.1 08/12/2022    LABA1C 6.4 07/20/2020   *metformin dose increased following October A1c    STATIN GAP IDENTIFIED    Per chart review: patient may be excluded with appropriate CMS eligible dx code for SUPD:  Myopathy (G72.0, G72.89, G72.9)  4/6/23 PCP OV: Statin myopathy (G72.0, T46.6X5A) dx used  Atorvastatin as below/allergy list and \"very sick on low dose pravastatin\" per 3/25/19 PCP note     Allergies   Allergen Reactions    Bee Venom Anaphylaxis    Tetanus Toxoids Anaphylaxis    Naproxen Swelling    Ancef [Cefazolin]     Atorvastatin Other (See Comments)     Muscle cramping    Buspar [Buspirone] Other (See Comments)     halucinations    Formaldehyde     Lisinopril

## 2024-02-20 NOTE — TELEPHONE ENCOUNTER
The patient canceled her appointment for yesterday and has no further appointments scheduled.  However I did add it to her history as a diagnosis so that I can incorporate it during her next visit

## 2024-02-20 NOTE — TELEPHONE ENCOUNTER
Noted below, thank you for reviewing!    =======================================================   For Pharmacy Admin Tracking Only    Program: [facility]  CPA in place:  No  Recommendation Provided To: Provider: 1 via Note to Provider  Intervention Accepted By: Provider: 1  Gap Closed?: No   Time Spent (min): 15

## 2024-03-14 DIAGNOSIS — M15.9 PRIMARY OSTEOARTHRITIS INVOLVING MULTIPLE JOINTS: ICD-10-CM

## 2024-03-14 DIAGNOSIS — E11.9 TYPE 2 DIABETES MELLITUS WITHOUT COMPLICATION, WITHOUT LONG-TERM CURRENT USE OF INSULIN (HCC): ICD-10-CM

## 2024-03-14 DIAGNOSIS — M25.512 LEFT SHOULDER PAIN, UNSPECIFIED CHRONICITY: ICD-10-CM

## 2024-03-15 DIAGNOSIS — E11.9 TYPE 2 DIABETES MELLITUS WITHOUT COMPLICATION, WITHOUT LONG-TERM CURRENT USE OF INSULIN (HCC): ICD-10-CM

## 2024-03-15 RX ORDER — CELECOXIB 200 MG/1
CAPSULE ORAL
Qty: 180 CAPSULE | Refills: 0 | Status: SHIPPED | OUTPATIENT
Start: 2024-03-15

## 2024-05-23 ENCOUNTER — TELEPHONE (OUTPATIENT)
Dept: PRIMARY CARE CLINIC | Age: 74
End: 2024-05-23

## 2024-06-14 DIAGNOSIS — E11.9 TYPE 2 DIABETES MELLITUS WITHOUT COMPLICATION, WITHOUT LONG-TERM CURRENT USE OF INSULIN (HCC): ICD-10-CM

## 2024-06-14 NOTE — TELEPHONE ENCOUNTER
10/16/23  
Medications and past visits were reviewed.  Patient needs to make an appointment to receive further refills on their medications.    
gracia
[phone]

## 2024-06-29 SDOH — ECONOMIC STABILITY: FOOD INSECURITY: WITHIN THE PAST 12 MONTHS, YOU WORRIED THAT YOUR FOOD WOULD RUN OUT BEFORE YOU GOT MONEY TO BUY MORE.: NEVER TRUE

## 2024-06-29 SDOH — ECONOMIC STABILITY: FOOD INSECURITY: WITHIN THE PAST 12 MONTHS, THE FOOD YOU BOUGHT JUST DIDN'T LAST AND YOU DIDN'T HAVE MONEY TO GET MORE.: NEVER TRUE

## 2024-06-29 SDOH — ECONOMIC STABILITY: TRANSPORTATION INSECURITY
IN THE PAST 12 MONTHS, HAS LACK OF TRANSPORTATION KEPT YOU FROM MEETINGS, WORK, OR FROM GETTING THINGS NEEDED FOR DAILY LIVING?: NO

## 2024-06-29 SDOH — ECONOMIC STABILITY: INCOME INSECURITY: HOW HARD IS IT FOR YOU TO PAY FOR THE VERY BASICS LIKE FOOD, HOUSING, MEDICAL CARE, AND HEATING?: NOT VERY HARD

## 2024-06-29 ASSESSMENT — PATIENT HEALTH QUESTIONNAIRE - PHQ9
SUM OF ALL RESPONSES TO PHQ QUESTIONS 1-9: 4
10. IF YOU CHECKED OFF ANY PROBLEMS, HOW DIFFICULT HAVE THESE PROBLEMS MADE IT FOR YOU TO DO YOUR WORK, TAKE CARE OF THINGS AT HOME, OR GET ALONG WITH OTHER PEOPLE: SOMEWHAT DIFFICULT
9. THOUGHTS THAT YOU WOULD BE BETTER OFF DEAD, OR OF HURTING YOURSELF: NOT AT ALL
8. MOVING OR SPEAKING SO SLOWLY THAT OTHER PEOPLE COULD HAVE NOTICED. OR THE OPPOSITE - BEING SO FIDGETY OR RESTLESS THAT YOU HAVE BEEN MOVING AROUND A LOT MORE THAN USUAL: NOT AT ALL
10. IF YOU CHECKED OFF ANY PROBLEMS, HOW DIFFICULT HAVE THESE PROBLEMS MADE IT FOR YOU TO DO YOUR WORK, TAKE CARE OF THINGS AT HOME, OR GET ALONG WITH OTHER PEOPLE: SOMEWHAT DIFFICULT
SUM OF ALL RESPONSES TO PHQ QUESTIONS 1-9: 4
1. LITTLE INTEREST OR PLEASURE IN DOING THINGS: NOT AT ALL
SUM OF ALL RESPONSES TO PHQ QUESTIONS 1-9: 4
9. THOUGHTS THAT YOU WOULD BE BETTER OFF DEAD, OR OF HURTING YOURSELF: NOT AT ALL
5. POOR APPETITE OR OVEREATING: SEVERAL DAYS
4. FEELING TIRED OR HAVING LITTLE ENERGY: SEVERAL DAYS
SUM OF ALL RESPONSES TO PHQ9 QUESTIONS 1 & 2: 0
3. TROUBLE FALLING OR STAYING ASLEEP: SEVERAL DAYS
2. FEELING DOWN, DEPRESSED OR HOPELESS: NOT AT ALL
2. FEELING DOWN, DEPRESSED OR HOPELESS: NOT AT ALL
SUM OF ALL RESPONSES TO PHQ QUESTIONS 1-9: 4
SUM OF ALL RESPONSES TO PHQ QUESTIONS 1-9: 4
4. FEELING TIRED OR HAVING LITTLE ENERGY: SEVERAL DAYS
3. TROUBLE FALLING OR STAYING ASLEEP: SEVERAL DAYS
6. FEELING BAD ABOUT YOURSELF - OR THAT YOU ARE A FAILURE OR HAVE LET YOURSELF OR YOUR FAMILY DOWN: SEVERAL DAYS
1. LITTLE INTEREST OR PLEASURE IN DOING THINGS: NOT AT ALL
8. MOVING OR SPEAKING SO SLOWLY THAT OTHER PEOPLE COULD HAVE NOTICED. OR THE OPPOSITE, BEING SO FIGETY OR RESTLESS THAT YOU HAVE BEEN MOVING AROUND A LOT MORE THAN USUAL: NOT AT ALL
7. TROUBLE CONCENTRATING ON THINGS, SUCH AS READING THE NEWSPAPER OR WATCHING TELEVISION: NOT AT ALL
6. FEELING BAD ABOUT YOURSELF - OR THAT YOU ARE A FAILURE OR HAVE LET YOURSELF OR YOUR FAMILY DOWN: SEVERAL DAYS
7. TROUBLE CONCENTRATING ON THINGS, SUCH AS READING THE NEWSPAPER OR WATCHING TELEVISION: NOT AT ALL
5. POOR APPETITE OR OVEREATING: SEVERAL DAYS

## 2024-07-01 ENCOUNTER — TELEPHONE (OUTPATIENT)
Dept: PHARMACY | Facility: CLINIC | Age: 74
End: 2024-07-01

## 2024-07-01 NOTE — TELEPHONE ENCOUNTER
Brii Camejo, APRN - CNP, from below, appointment with you 7/2/24 - identified with a care gap for diabetes without a statin  Per patient chart review: Patient has a listed allergy to statins. Unfortunately, allergies are not billed and a CMS approved dx code must be entered on a billable office visit in order for this patient to be excluded. - 4/6/23 PCP OV: Statin myopathy (G72.0, T46.6X5A) dx used    If appropriate, please consider using the following CMS eligible diagnosis within your visit encounter:  Statin myopathy (G72.0)  - This will complete/close this insurer-identified gap for this calendar year.    Please let me know if I can further assist, or if you would like me to try to reach patient to discuss any further.  Thank you,  Avani Dumont, PharmD, [facility]  Population Health Pharmacist  [facility] Clinical [facility]  Department, toll free: [phone], option 1    ==============================================================    POPULATION [facility] CLINICAL PHARMACY: STATIN THERAPY REVIEW  Identified statin use in persons with diabetes care gap per [employer].    Patient also appears to be prescribed: ACE/ARB and Diabetes (PDC % for both)    ASSESSMENT  STATIN GAP IDENTIFIED    Per chart review: patient may be excluded with appropriate CMS eligible dx code for SUPD:  Myopathy (G72.0, G72.89, G72.9)  4/6/23 PCP OV: Statin myopathy (G72.0, T46.6X5A) dx used  Atorvastatin as below/allergy list and \"very sick on low dose pravastatin\" per 3/25/19 PCP note     Allergies   Allergen Reactions    Bee Venom Anaphylaxis    Tetanus Toxoids Anaphylaxis    Naproxen Swelling    Ancef [Cefazolin]     Atorvastatin Other (See Comments)     Muscle cramping    Buspar [Buspirone] Other (See Comments)     halucinations    Formaldehyde     Lisinopril     Meloxicam Rash     Lab Results   Component Value Date    CHOL 319 (H) 09/12/2022    TRIG 171 (H) 09/12/2022    HDL 46 09/12/2022     Lab Results   Component Value Date

## 2024-07-02 ENCOUNTER — OFFICE VISIT (OUTPATIENT)
Dept: PRIMARY CARE CLINIC | Age: 74
End: 2024-07-02

## 2024-07-02 VITALS
DIASTOLIC BLOOD PRESSURE: 89 MMHG | OXYGEN SATURATION: 95 % | HEART RATE: 103 BPM | BODY MASS INDEX: 43.55 KG/M2 | SYSTOLIC BLOOD PRESSURE: 152 MMHG | WEIGHT: 271 LBS | HEIGHT: 66 IN

## 2024-07-02 DIAGNOSIS — E11.9 TYPE 2 DIABETES MELLITUS WITHOUT COMPLICATION, WITHOUT LONG-TERM CURRENT USE OF INSULIN (HCC): Primary | ICD-10-CM

## 2024-07-02 DIAGNOSIS — G72.0 STATIN MYOPATHY: ICD-10-CM

## 2024-07-02 DIAGNOSIS — F41.8 ANXIOUS DEPRESSION: ICD-10-CM

## 2024-07-02 DIAGNOSIS — E11.9 TYPE 2 DIABETES MELLITUS WITHOUT COMPLICATION, WITHOUT LONG-TERM CURRENT USE OF INSULIN (HCC): ICD-10-CM

## 2024-07-02 DIAGNOSIS — G93.32 CHRONIC FATIGUE AND IMMUNE DYSFUNCTION SYNDROME (HCC): ICD-10-CM

## 2024-07-02 DIAGNOSIS — Z23 NEED FOR VACCINATION: ICD-10-CM

## 2024-07-02 DIAGNOSIS — D89.89 CHRONIC FATIGUE AND IMMUNE DYSFUNCTION SYNDROME (HCC): ICD-10-CM

## 2024-07-02 DIAGNOSIS — T46.6X5A STATIN MYOPATHY: ICD-10-CM

## 2024-07-02 DIAGNOSIS — R68.89 HEAT INTOLERANCE: ICD-10-CM

## 2024-07-02 DIAGNOSIS — I10 ESSENTIAL HYPERTENSION: ICD-10-CM

## 2024-07-02 DIAGNOSIS — R73.9 HYPERGLYCEMIA: ICD-10-CM

## 2024-07-02 DIAGNOSIS — E66.01 OBESITY, CLASS III, BMI 40-49.9 (MORBID OBESITY) (HCC): ICD-10-CM

## 2024-07-02 RX ORDER — LOSARTAN POTASSIUM 50 MG/1
50 TABLET ORAL DAILY
Qty: 90 TABLET | Refills: 1 | Status: SHIPPED | OUTPATIENT
Start: 2024-07-02

## 2024-07-02 RX ORDER — TIRZEPATIDE 2.5 MG/.5ML
2.5 INJECTION, SOLUTION SUBCUTANEOUS WEEKLY
Qty: 4 EACH | Refills: 1 | Status: SHIPPED | OUTPATIENT
Start: 2024-07-02

## 2024-07-02 RX ORDER — SERTRALINE HYDROCHLORIDE 100 MG/1
100 TABLET, FILM COATED ORAL DAILY
Qty: 90 TABLET | Refills: 1 | Status: SHIPPED | OUTPATIENT
Start: 2024-07-02

## 2024-07-02 RX ORDER — GLIMEPIRIDE 4 MG/1
4 TABLET ORAL
Qty: 90 TABLET | Refills: 1 | Status: SHIPPED | OUTPATIENT
Start: 2024-07-02

## 2024-07-02 RX ORDER — GLUCOSAMINE HCL/CHONDROITIN SU 500-400 MG
CAPSULE ORAL DAILY
Qty: 100 STRIP | Refills: 5 | Status: SHIPPED | OUTPATIENT
Start: 2024-07-02

## 2024-07-02 RX ORDER — TRIAMTERENE AND HYDROCHLOROTHIAZIDE 37.5; 25 MG/1; MG/1
TABLET ORAL
Qty: 90 TABLET | Refills: 3
Start: 2024-07-02

## 2024-07-02 ASSESSMENT — ENCOUNTER SYMPTOMS
SHORTNESS OF BREATH: 0
CHEST TIGHTNESS: 0
DIARRHEA: 1
BLOOD IN STOOL: 0
TROUBLE SWALLOWING: 0

## 2024-07-02 NOTE — PROGRESS NOTES
[facility]  [facility] PRIMARY CARE  [facility] [place] MAIN FLOOR  [place] [address]  Dept: [phone]  Dept Fax: [phone]    Coco Strong (:  1950) is a 74 y.o. female,Established patient, here for evaluation of the following chief complaint(s):  Medication Refill (Medication refills )      Assessment & Plan   ASSESSMENT/PLAN:  1. Type 2 diabetes mellitus without complication, without long-term current use of insulin (HCC)  -     Comprehensive Metabolic Panel; Future  -     Hemoglobin A1C; Future  -     glimepiride (AMARYL) 4 MG tablet; Take 1 tablet by mouth every morning (before breakfast), Disp-90 tablet, R-1Normal  -     Tirzepatide (MOUNJARO) 2.5 MG/0.5ML SOPN SC injection; Inject 0.5 mLs into the skin once a week, Disp-4 each, R-1Normal  -     blood glucose monitor strips; by Other route daily Test once daily, Other, DAILY Starting 2024, Disp-100 strip, R-5, Normal  2. Statin myopathy  3. Obesity, Class III, BMI 40-49.9 (morbid obesity) (HCC)  -     Tirzepatide (MOUNJARO) 2.5 MG/0.5ML SOPN SC injection; Inject 0.5 mLs into the skin once a week, Disp-4 each, R-1Normal  4. Chronic fatigue and immune dysfunction syndrome (HCC)  5. Essential hypertension  -     Comprehensive Metabolic Panel; Future  -     triamterene-hydroCHLOROthiazide (MAXZIDE-25) 37.5-25 MG per tablet; TAKE ONE TABLET BY MOUTH DAILY AS NEEDED FOR (EDEMA), Disp-90 tablet, R-3NO PRINT  6. Anxious depression  -     sertraline (ZOLOFT) 100 MG tablet; Take 1 tablet by mouth daily, Disp-90 tablet, R-1Normal  7. Hyperglycemia  -     blood glucose monitor strips; by Other route daily Test once daily, Other, DAILY Starting 2024, Disp-100 strip, R-5, Normal  8. Need for vaccination  -     Pneumococcal, PCV20, PREVNAR 20, (age 6w+), IM, PF  9. Heat intolerance  -     TSH With Reflex Ft4; Future    Blood pressure controlled with home ambulatory readings, no changes made

## 2024-07-03 NOTE — TELEPHONE ENCOUNTER
Noted statin myopathy dx with yesterday's visit, thank you for reviewing    =======================================================   For Pharmacy Admin Tracking Only    Program: Pop Health  CPA in place:  No  Recommendation Provided To: Provider: 1 via Note to Provider  Intervention Accepted By: Provider: 1  Gap Closed?: Yes   Time Spent (min): 15

## 2024-07-06 DIAGNOSIS — E11.9 TYPE 2 DIABETES MELLITUS WITHOUT COMPLICATION, WITHOUT LONG-TERM CURRENT USE OF INSULIN (HCC): ICD-10-CM

## 2024-07-06 DIAGNOSIS — F41.8 ANXIOUS DEPRESSION: ICD-10-CM

## 2024-07-09 RX ORDER — SERTRALINE HYDROCHLORIDE 100 MG/1
100 TABLET, FILM COATED ORAL DAILY
Qty: 90 TABLET | Refills: 1 | OUTPATIENT
Start: 2024-07-09

## 2024-07-09 RX ORDER — GLIMEPIRIDE 4 MG/1
4 TABLET ORAL
Qty: 90 TABLET | Refills: 1 | OUTPATIENT
Start: 2024-07-09

## 2024-09-13 DIAGNOSIS — E66.01 OBESITY, CLASS III, BMI 40-49.9 (MORBID OBESITY) (HCC): ICD-10-CM

## 2024-09-13 DIAGNOSIS — E11.9 TYPE 2 DIABETES MELLITUS WITHOUT COMPLICATION, WITHOUT LONG-TERM CURRENT USE OF INSULIN (HCC): ICD-10-CM

## 2024-09-16 RX ORDER — TIRZEPATIDE 2.5 MG/.5ML
2.5 INJECTION, SOLUTION SUBCUTANEOUS WEEKLY
Qty: 2 ML | Refills: 0 | Status: SHIPPED | OUTPATIENT
Start: 2024-09-16 | End: 2024-09-19 | Stop reason: DRUGHIGH

## 2024-09-19 DIAGNOSIS — E66.01 OBESITY, CLASS III, BMI 40-49.9 (MORBID OBESITY) (HCC): ICD-10-CM

## 2024-09-19 DIAGNOSIS — E11.9 TYPE 2 DIABETES MELLITUS WITHOUT COMPLICATION, WITHOUT LONG-TERM CURRENT USE OF INSULIN (HCC): ICD-10-CM

## 2024-09-21 DIAGNOSIS — E11.9 TYPE 2 DIABETES MELLITUS WITHOUT COMPLICATION, WITHOUT LONG-TERM CURRENT USE OF INSULIN (HCC): ICD-10-CM

## 2024-09-30 ENCOUNTER — HOSPITAL ENCOUNTER (OUTPATIENT)
Age: 74
Discharge: HOME | End: 2024-09-30
Payer: MEDICARE

## 2024-09-30 DIAGNOSIS — E11.9 TYPE 2 DIABETES MELLITUS WITHOUT COMPLICATION, WITHOUT LONG-TERM CURRENT USE OF INSULIN (HCC): ICD-10-CM

## 2024-09-30 DIAGNOSIS — I10 ESSENTIAL HYPERTENSION: ICD-10-CM

## 2024-09-30 DIAGNOSIS — R68.89 HEAT INTOLERANCE: ICD-10-CM

## 2024-09-30 LAB
ALBUMIN SERPL-MCNC: 4.7 G/DL (ref 3.5–5.2)
ALBUMIN/GLOB SERPL: 2 {RATIO} (ref 1–2.5)
ALP SERPL-CCNC: 104 U/L (ref 35–104)
ALT SERPL-CCNC: 33 U/L (ref 10–35)
ANION GAP SERPL CALCULATED.3IONS-SCNC: 17 MMOL/L (ref 9–16)
AST SERPL-CCNC: 26 U/L (ref 10–35)
BILIRUB SERPL-MCNC: 0.5 MG/DL (ref 0–1.2)
BUN SERPL-MCNC: 18 MG/DL (ref 8–23)
CALCIUM SERPL-MCNC: 10.1 MG/DL (ref 8.6–10.4)
CHLORIDE SERPL-SCNC: 103 MMOL/L (ref 98–107)
CO2 SERPL-SCNC: 20 MMOL/L (ref 20–31)
CREAT SERPL-MCNC: 1 MG/DL (ref 0.5–0.9)
CREAT UR-MCNC: 54.9 MG/DL (ref 28–217)
EST. AVERAGE GLUCOSE BLD GHB EST-MCNC: 131 MG/DL
GFR, ESTIMATED: 60 ML/MIN/1.73M2
GLUCOSE SERPL-MCNC: 157 MG/DL (ref 74–99)
HBA1C MFR BLD: 6.2 % (ref 4–6)
MICROALBUMIN UR-MCNC: <12 MG/L (ref 0–20)
MICROALBUMIN/CREAT UR-RTO: NORMAL MCG/MG CREAT (ref 0–25)
POTASSIUM SERPL-SCNC: 4.2 MMOL/L (ref 3.7–5.3)
PROT SERPL-MCNC: 7.6 G/DL (ref 6.6–8.7)
SODIUM SERPL-SCNC: 140 MMOL/L (ref 136–145)
TSH SERPL DL<=0.05 MIU/L-ACNC: 3.78 UIU/ML (ref 0.27–4.2)

## 2024-09-30 PROCEDURE — 36415 COLL VENOUS BLD VENIPUNCTURE: CPT

## 2024-09-30 PROCEDURE — 84443 ASSAY THYROID STIM HORMONE: CPT

## 2024-09-30 PROCEDURE — 82570 ASSAY OF URINE CREATININE: CPT

## 2024-09-30 PROCEDURE — 82043 UR ALBUMIN QUANTITATIVE: CPT

## 2024-09-30 PROCEDURE — 80053 COMPREHEN METABOLIC PANEL: CPT

## 2024-09-30 PROCEDURE — 83036 HEMOGLOBIN GLYCOSYLATED A1C: CPT

## 2024-10-03 ENCOUNTER — OFFICE VISIT (OUTPATIENT)
Dept: PRIMARY CARE CLINIC | Age: 74
End: 2024-10-03

## 2024-10-03 VITALS
DIASTOLIC BLOOD PRESSURE: 92 MMHG | BODY MASS INDEX: 42.42 KG/M2 | WEIGHT: 262.8 LBS | HEART RATE: 94 BPM | SYSTOLIC BLOOD PRESSURE: 136 MMHG | TEMPERATURE: 97.3 F | OXYGEN SATURATION: 97 %

## 2024-10-03 DIAGNOSIS — R79.89 ELEVATED FERRITIN LEVEL: ICD-10-CM

## 2024-10-03 DIAGNOSIS — I10 ESSENTIAL HYPERTENSION: ICD-10-CM

## 2024-10-03 DIAGNOSIS — F41.8 ANXIOUS DEPRESSION: ICD-10-CM

## 2024-10-03 DIAGNOSIS — T46.6X5A STATIN MYOPATHY: ICD-10-CM

## 2024-10-03 DIAGNOSIS — E11.9 TYPE 2 DIABETES MELLITUS WITHOUT COMPLICATION, WITHOUT LONG-TERM CURRENT USE OF INSULIN (HCC): Primary | ICD-10-CM

## 2024-10-03 DIAGNOSIS — G72.0 STATIN MYOPATHY: ICD-10-CM

## 2024-10-03 RX ORDER — POTASSIUM CHLORIDE 750 MG/1
TABLET, EXTENDED RELEASE ORAL
Qty: 90 TABLET | Refills: 2 | OUTPATIENT
Start: 2024-10-03

## 2024-10-03 RX ORDER — POTASSIUM CHLORIDE 750 MG/1
TABLET, EXTENDED RELEASE ORAL
Qty: 90 TABLET | Refills: 2 | Status: SHIPPED | OUTPATIENT
Start: 2024-10-03

## 2024-10-03 NOTE — PROGRESS NOTES
Coco Strong (:  1950) is a 74 y.o. female,Established patient, here for evaluation of the following chief complaint(s):  Diabetes, Hypertension, and Cholesterol Problem    Coco Strong is a 74-year-old female here today for follow-up for type 2 diabetes, obesity, and hypertension.  Last seen on .  A1c was 7.6 Oct 2023, with a 2024 GFR of 60 and creatinine of 1.0.  Patient was prescribed tirzepatide 2.5 mg once weekly for improved and continued diabetic control along with weight loss management given morbid obesity with a BMI over 40.  Microalbuminuria resolved last check       Assessment & Plan  Type 2 diabetes mellitus without complication, without long-term current use of insulin (HCC)   Chronic, at goal (stable), continue current treatment plan    Orders:    Lipid Panel; Future    CBC with Auto Differential; Future    Hemoglobin A1C; Future    Essential hypertension   Chronic, at goal (stable), continue current treatment plan    Orders:    potassium chloride (KLOR-CON) 10 MEQ extended release tablet; TAKE ONE TABLET BY MOUTH DAILY    Lipid Panel; Future    CBC with Auto Differential; Future    Statin myopathy          Anxious depression   Chronic, at goal (stable), continue current treatment plan         Elevated ferritin level       Orders:    Ferritin; Future    Iron and TIBC; Future      Return in about 6 months (around 4/3/2025) for Diabetes, HTN.       Subjective   Overall tolerating GLP. Did have initial Se of reflux and diarrhea which resolved after 1st 2 doses.  Does have appetite suppression.  Sugars were over 200, now running under 120 with injections    Has changed her eating habits, eating half portions and less sweets.    BP at home running 120/70's routinely.         Review of Systems       Objective   Physical Exam  Constitutional:       General: She is not in acute distress.     Appearance: She is well-developed. She is obese. She is not diaphoretic.   HENT:      Head:

## 2024-10-20 DIAGNOSIS — E66.01 OBESITY, CLASS III, BMI 40-49.9 (MORBID OBESITY): ICD-10-CM

## 2024-10-20 DIAGNOSIS — E11.9 TYPE 2 DIABETES MELLITUS WITHOUT COMPLICATION, WITHOUT LONG-TERM CURRENT USE OF INSULIN (HCC): ICD-10-CM

## 2024-12-09 DIAGNOSIS — I10 ESSENTIAL HYPERTENSION: ICD-10-CM

## 2024-12-09 DIAGNOSIS — F41.8 ANXIOUS DEPRESSION: ICD-10-CM

## 2024-12-09 DIAGNOSIS — E11.9 TYPE 2 DIABETES MELLITUS WITHOUT COMPLICATION, WITHOUT LONG-TERM CURRENT USE OF INSULIN (HCC): ICD-10-CM

## 2024-12-10 RX ORDER — GLIMEPIRIDE 4 MG/1
4 TABLET ORAL
Qty: 90 TABLET | Refills: 1 | Status: SHIPPED | OUTPATIENT
Start: 2024-12-10 | End: 2025-12-10

## 2024-12-10 RX ORDER — LOSARTAN POTASSIUM 50 MG/1
50 TABLET ORAL DAILY
Qty: 90 TABLET | Refills: 1 | Status: SHIPPED | OUTPATIENT
Start: 2024-12-10 | End: 2025-12-10

## 2024-12-10 RX ORDER — SERTRALINE HYDROCHLORIDE 100 MG/1
100 TABLET, FILM COATED ORAL DAILY
Qty: 90 TABLET | Refills: 1 | Status: SHIPPED | OUTPATIENT
Start: 2024-12-10 | End: 2025-12-10

## 2024-12-11 DIAGNOSIS — M15.0 PRIMARY OSTEOARTHRITIS INVOLVING MULTIPLE JOINTS: ICD-10-CM

## 2024-12-11 DIAGNOSIS — M25.512 LEFT SHOULDER PAIN, UNSPECIFIED CHRONICITY: ICD-10-CM

## 2024-12-11 RX ORDER — CELECOXIB 200 MG/1
CAPSULE ORAL
Qty: 180 CAPSULE | Refills: 0 | Status: SHIPPED | OUTPATIENT
Start: 2024-12-11

## 2025-01-09 ENCOUNTER — PATIENT MESSAGE (OUTPATIENT)
Dept: PRIMARY CARE CLINIC | Age: 75
End: 2025-01-09

## 2025-01-09 DIAGNOSIS — E11.9 TYPE 2 DIABETES MELLITUS WITHOUT COMPLICATION, WITHOUT LONG-TERM CURRENT USE OF INSULIN (HCC): ICD-10-CM

## 2025-01-09 DIAGNOSIS — E66.01 OBESITY, CLASS III, BMI 40-49.9 (MORBID OBESITY): Primary | ICD-10-CM

## 2025-02-21 ENCOUNTER — PATIENT MESSAGE (OUTPATIENT)
Dept: PRIMARY CARE CLINIC | Age: 75
End: 2025-02-21

## 2025-02-21 DIAGNOSIS — R73.9 HYPERGLYCEMIA: ICD-10-CM

## 2025-02-21 DIAGNOSIS — E11.9 TYPE 2 DIABETES MELLITUS WITHOUT COMPLICATION, WITHOUT LONG-TERM CURRENT USE OF INSULIN (HCC): ICD-10-CM

## 2025-02-24 RX ORDER — GLUCOSAMINE HCL/CHONDROITIN SU 500-400 MG
CAPSULE ORAL DAILY
Qty: 100 STRIP | Refills: 5 | Status: SHIPPED | OUTPATIENT
Start: 2025-02-24 | End: 2026-02-24

## 2025-03-19 DIAGNOSIS — E11.9 TYPE 2 DIABETES MELLITUS WITHOUT COMPLICATION, WITHOUT LONG-TERM CURRENT USE OF INSULIN: ICD-10-CM

## 2025-03-19 DIAGNOSIS — M15.0 PRIMARY OSTEOARTHRITIS INVOLVING MULTIPLE JOINTS: ICD-10-CM

## 2025-03-19 DIAGNOSIS — M25.512 LEFT SHOULDER PAIN, UNSPECIFIED CHRONICITY: ICD-10-CM

## 2025-03-19 RX ORDER — CELECOXIB 200 MG/1
200 CAPSULE ORAL 2 TIMES DAILY
Qty: 180 CAPSULE | Refills: 0 | OUTPATIENT
Start: 2025-03-19

## 2025-03-19 RX ORDER — CELECOXIB 200 MG/1
200 CAPSULE ORAL 2 TIMES DAILY
Qty: 180 CAPSULE | Refills: 0 | Status: SHIPPED | OUTPATIENT
Start: 2025-03-19 | End: 2026-03-19

## 2025-03-31 SDOH — ECONOMIC STABILITY: TRANSPORTATION INSECURITY
IN THE PAST 12 MONTHS, HAS THE LACK OF TRANSPORTATION KEPT YOU FROM MEDICAL APPOINTMENTS OR FROM GETTING MEDICATIONS?: NO

## 2025-03-31 SDOH — ECONOMIC STABILITY: INCOME INSECURITY: IN THE LAST 12 MONTHS, WAS THERE A TIME WHEN YOU WERE NOT ABLE TO PAY THE MORTGAGE OR RENT ON TIME?: NO

## 2025-03-31 SDOH — ECONOMIC STABILITY: FOOD INSECURITY: WITHIN THE PAST 12 MONTHS, YOU WORRIED THAT YOUR FOOD WOULD RUN OUT BEFORE YOU GOT MONEY TO BUY MORE.: NEVER TRUE

## 2025-03-31 SDOH — ECONOMIC STABILITY: FOOD INSECURITY: WITHIN THE PAST 12 MONTHS, THE FOOD YOU BOUGHT JUST DIDN'T LAST AND YOU DIDN'T HAVE MONEY TO GET MORE.: NEVER TRUE

## 2025-03-31 ASSESSMENT — PATIENT HEALTH QUESTIONNAIRE - PHQ9
SUM OF ALL RESPONSES TO PHQ QUESTIONS 1-9: 1
2. FEELING DOWN, DEPRESSED OR HOPELESS: NOT AT ALL
9. THOUGHTS THAT YOU WOULD BE BETTER OFF DEAD, OR OF HURTING YOURSELF: NOT AT ALL
5. POOR APPETITE OR OVEREATING: NOT AT ALL
1. LITTLE INTEREST OR PLEASURE IN DOING THINGS: NOT AT ALL
10. IF YOU CHECKED OFF ANY PROBLEMS, HOW DIFFICULT HAVE THESE PROBLEMS MADE IT FOR YOU TO DO YOUR WORK, TAKE CARE OF THINGS AT HOME, OR GET ALONG WITH OTHER PEOPLE: NOT DIFFICULT AT ALL
SUM OF ALL RESPONSES TO PHQ QUESTIONS 1-9: 1
3. TROUBLE FALLING OR STAYING ASLEEP: SEVERAL DAYS
SUM OF ALL RESPONSES TO PHQ QUESTIONS 1-9: 1
7. TROUBLE CONCENTRATING ON THINGS, SUCH AS READING THE NEWSPAPER OR WATCHING TELEVISION: NOT AT ALL
4. FEELING TIRED OR HAVING LITTLE ENERGY: NOT AT ALL
6. FEELING BAD ABOUT YOURSELF - OR THAT YOU ARE A FAILURE OR HAVE LET YOURSELF OR YOUR FAMILY DOWN: NOT AT ALL
7. TROUBLE CONCENTRATING ON THINGS, SUCH AS READING THE NEWSPAPER OR WATCHING TELEVISION: NOT AT ALL
3. TROUBLE FALLING OR STAYING ASLEEP: SEVERAL DAYS
1. LITTLE INTEREST OR PLEASURE IN DOING THINGS: NOT AT ALL
5. POOR APPETITE OR OVEREATING: NOT AT ALL
4. FEELING TIRED OR HAVING LITTLE ENERGY: NOT AT ALL
SUM OF ALL RESPONSES TO PHQ QUESTIONS 1-9: 1
SUM OF ALL RESPONSES TO PHQ QUESTIONS 1-9: 1
8. MOVING OR SPEAKING SO SLOWLY THAT OTHER PEOPLE COULD HAVE NOTICED. OR THE OPPOSITE, BEING SO FIGETY OR RESTLESS THAT YOU HAVE BEEN MOVING AROUND A LOT MORE THAN USUAL: NOT AT ALL
2. FEELING DOWN, DEPRESSED OR HOPELESS: NOT AT ALL
10. IF YOU CHECKED OFF ANY PROBLEMS, HOW DIFFICULT HAVE THESE PROBLEMS MADE IT FOR YOU TO DO YOUR WORK, TAKE CARE OF THINGS AT HOME, OR GET ALONG WITH OTHER PEOPLE: NOT DIFFICULT AT ALL
9. THOUGHTS THAT YOU WOULD BE BETTER OFF DEAD, OR OF HURTING YOURSELF: NOT AT ALL
8. MOVING OR SPEAKING SO SLOWLY THAT OTHER PEOPLE COULD HAVE NOTICED. OR THE OPPOSITE - BEING SO FIDGETY OR RESTLESS THAT YOU HAVE BEEN MOVING AROUND A LOT MORE THAN USUAL: NOT AT ALL
6. FEELING BAD ABOUT YOURSELF - OR THAT YOU ARE A FAILURE OR HAVE LET YOURSELF OR YOUR FAMILY DOWN: NOT AT ALL

## 2025-04-02 ENCOUNTER — HOSPITAL ENCOUNTER (OUTPATIENT)
Age: 75
Discharge: HOME | End: 2025-04-02
Payer: MEDICARE

## 2025-04-02 ENCOUNTER — TELEPHONE (OUTPATIENT)
Dept: PHARMACY | Facility: CLINIC | Age: 75
End: 2025-04-02

## 2025-04-02 DIAGNOSIS — I10 ESSENTIAL HYPERTENSION: ICD-10-CM

## 2025-04-02 DIAGNOSIS — E11.9 TYPE 2 DIABETES MELLITUS WITHOUT COMPLICATION, WITHOUT LONG-TERM CURRENT USE OF INSULIN: ICD-10-CM

## 2025-04-02 DIAGNOSIS — R79.89 ELEVATED FERRITIN LEVEL: ICD-10-CM

## 2025-04-02 LAB
BASOPHILS # BLD: 0.08 K/UL (ref 0–0.2)
BASOPHILS NFR BLD: 1 % (ref 0–2)
CHOLEST SERPL-MCNC: 308 MG/DL (ref 0–199)
CHOLESTEROL/HDL RATIO: 6.3
EOSINOPHIL # BLD: 0.12 K/UL (ref 0–0.44)
EOSINOPHILS RELATIVE PERCENT: 1 % (ref 1–4)
ERYTHROCYTE [DISTWIDTH] IN BLOOD BY AUTOMATED COUNT: 14 % (ref 11.8–14.4)
EST. AVERAGE GLUCOSE BLD GHB EST-MCNC: 105 MG/DL
FERRITIN SERPL-MCNC: 432 NG/ML
HBA1C MFR BLD: 5.3 % (ref 4–6)
HCT VFR BLD AUTO: 47.6 % (ref 36.3–47.1)
HDLC SERPL-MCNC: 49 MG/DL
HGB BLD-MCNC: 14.9 G/DL (ref 11.9–15.1)
IMM GRANULOCYTES # BLD AUTO: 0.03 K/UL (ref 0–0.3)
IMM GRANULOCYTES NFR BLD: 0 %
IRON SATN MFR SERPL: 31 % (ref 20–55)
IRON SERPL-MCNC: 94 UG/DL (ref 37–145)
LDLC SERPL CALC-MCNC: 233 MG/DL (ref 0–100)
LYMPHOCYTES NFR BLD: 3.15 K/UL (ref 1.1–3.7)
LYMPHOCYTES RELATIVE PERCENT: 31 % (ref 24–43)
MCH RBC QN AUTO: 28.4 PG (ref 25.2–33.5)
MCHC RBC AUTO-ENTMCNC: 31.3 G/DL (ref 28.4–34.8)
MCV RBC AUTO: 90.8 FL (ref 82.6–102.9)
MONOCYTES NFR BLD: 0.5 K/UL (ref 0.1–1.2)
MONOCYTES NFR BLD: 5 % (ref 3–12)
NEUTROPHILS NFR BLD: 62 % (ref 36–65)
NEUTS SEG NFR BLD: 6.2 K/UL (ref 1.5–8.1)
NRBC BLD-RTO: 0 PER 100 WBC
PLATELET # BLD AUTO: 255 K/UL (ref 138–453)
PMV BLD AUTO: 10.3 FL (ref 8.1–13.5)
RBC # BLD AUTO: 5.24 M/UL (ref 3.95–5.11)
TIBC SERPL-MCNC: 306 UG/DL (ref 250–450)
TRIGL SERPL-MCNC: 129 MG/DL
UNSATURATED IRON BINDING CAPACITY: 212 UG/DL (ref 112–347)
VLDLC SERPL CALC-MCNC: 26 MG/DL (ref 1–30)
WBC OTHER # BLD: 10.1 K/UL (ref 3.5–11.3)

## 2025-04-02 PROCEDURE — 83550 IRON BINDING TEST: CPT

## 2025-04-02 PROCEDURE — 85025 COMPLETE CBC W/AUTO DIFF WBC: CPT

## 2025-04-02 PROCEDURE — 36415 COLL VENOUS BLD VENIPUNCTURE: CPT

## 2025-04-02 PROCEDURE — 82728 ASSAY OF FERRITIN: CPT

## 2025-04-02 PROCEDURE — 83036 HEMOGLOBIN GLYCOSYLATED A1C: CPT

## 2025-04-02 PROCEDURE — 80061 LIPID PANEL: CPT

## 2025-04-02 PROCEDURE — 83540 ASSAY OF IRON: CPT

## 2025-04-02 NOTE — TELEPHONE ENCOUNTER
Brii Camejo, GENE - CNP, from below, appointment with you 4/3/25 - identified with a care gap for diabetes without a statin  Per patient chart review: Was previously excluded with a dx code of Statin myopathy (G72.0, T46.6X5A) at 7/2/24 and 4/6/23 OVs. This dx code must be entered once each calendar year for the patient to be excluded from taking a statin.    If appropriate, please consider using the following CMS eligible diagnosis within your visit encounter:  Statin myopathy (G72.0)  - This will complete/close this insurer-identified gap for this calendar year.    Please let me know if I can further assist.  Thank you,  Avani Dumont, PharmD, [facility]  Population Health Pharmacist  [facility] Clinical Pharmacy  Department, toll free: [phone], option 1    ==============================================================    [facility] CLINICAL PHARMACY: STATIN THERAPY REVIEW  Identified statin use in persons with diabetes care gap per [employer]. Records dated: 3/27/25.    Patient also appears to be prescribed: ACE/ARB and Diabetes (@% PDC for both in 2024; both last filled 3/19/25 for 90ds)    ASSESSMENT  STATIN GAP IDENTIFIED    Per chart review: patient may be excluded with appropriate CMS eligible dx code for SUPD:  Myopathy (G72.0, G72.89, G72.9)  4/6/23 & 7/2/24 PCP OVs: Statin myopathy (G72.0, T46.6X5A) dx used  Atorvastatin as below/allergy list and \"very sick on low dose pravastatin\" per 3/25/19 PCP note     Allergies   Allergen Reactions    Bee Venom Anaphylaxis    Tetanus Toxoids Anaphylaxis    Naproxen Swelling    Ancef [Cefazolin]     Atorvastatin Other (See Comments)     Muscle cramping    Buspar [Buspirone] Other (See Comments)     halucinations    Formaldehyde     Lisinopril     Meloxicam Rash     PLAN  The following are interventions that have been identified:   Statin Gap (Diabetes): patient may be excluded with a CMS eligible dx code if entered into a billable office visit as a visit

## 2025-04-03 ENCOUNTER — OFFICE VISIT (OUTPATIENT)
Dept: PRIMARY CARE CLINIC | Age: 75
End: 2025-04-03

## 2025-04-03 VITALS
SYSTOLIC BLOOD PRESSURE: 140 MMHG | TEMPERATURE: 85 F | OXYGEN SATURATION: 97 % | WEIGHT: 238 LBS | BODY MASS INDEX: 38.41 KG/M2 | HEART RATE: 96 BPM | DIASTOLIC BLOOD PRESSURE: 92 MMHG

## 2025-04-03 DIAGNOSIS — T46.6X5A STATIN MYOPATHY: ICD-10-CM

## 2025-04-03 DIAGNOSIS — I10 ESSENTIAL HYPERTENSION: Primary | ICD-10-CM

## 2025-04-03 DIAGNOSIS — Z78.0 POST-MENOPAUSAL: ICD-10-CM

## 2025-04-03 DIAGNOSIS — F41.8 ANXIOUS DEPRESSION: ICD-10-CM

## 2025-04-03 DIAGNOSIS — E66.01 OBESITY, CLASS III, BMI 40-49.9 (MORBID OBESITY): ICD-10-CM

## 2025-04-03 DIAGNOSIS — G72.0 STATIN MYOPATHY: ICD-10-CM

## 2025-04-03 DIAGNOSIS — Z53.20 MAMMOGRAM DECLINED: ICD-10-CM

## 2025-04-03 DIAGNOSIS — E66.812 CLASS 2 DRUG-INDUCED OBESITY WITH SERIOUS COMORBIDITY AND BODY MASS INDEX (BMI) OF 35.0 TO 35.9 IN ADULT: ICD-10-CM

## 2025-04-03 DIAGNOSIS — E11.9 TYPE 2 DIABETES MELLITUS WITHOUT COMPLICATION, WITHOUT LONG-TERM CURRENT USE OF INSULIN: ICD-10-CM

## 2025-04-03 DIAGNOSIS — E66.1 CLASS 2 DRUG-INDUCED OBESITY WITH SERIOUS COMORBIDITY AND BODY MASS INDEX (BMI) OF 35.0 TO 35.9 IN ADULT: ICD-10-CM

## 2025-04-03 NOTE — PROGRESS NOTES
[address] [facility] MAIN FLOOR  [facility] [address]   4/3/2025    Coco Strong is a 75 y.o. female who presents today for her medical conditions and/or complaints as noted below.    Coco Strong is scheduled today for Hypertension and Diabetes  .      HPI:     History of Present Illness  The patient is a 75-year-old female who presents today for a routine follow-up of her chronic conditions, including type 2 diabetes, hypertension, and hyperlipidemia, although she has statin intolerance. She was last seen in the office on 10/03/2024.    She has experienced weight loss, with her abdominal girth decreasing. Her weight was 280 pounds when she started the tirzepatide. She has been on tirzepatide 5 mg once weekly since 01/2025, which she tolerates well and continues to lose weight. She monitors her weight at home and aims to lose 50 pounds by [place] through dietary changes and not solely relying on Mounjaro. She has noticed a change in her food preferences, now craving cucumbers and salads. Her goal is to break 200 pounds, a target she has been striving for over the past 5 years. She acknowledges that she will not reach 135 pounds.    She has type 2 diabetes. She reports no gastrointestinal issues with the current metformin dosage. She monitors her blood glucose levels at home. She also reports that her urinary incontinence has improved with better glycemic control. She is currently on glimepiride 4 mg daily and metformin 500 mg twice daily. She recalls an attempt to increase the metformin dosage to 1000 mg in the morning, which resulted in frequent bathroom visits.    She has hypertension. She monitors her blood pressure at home using a wrist cuff, with readings typically around 122/88 or 122/90, and occasionally reaching 140/88. She takes triamterene as needed when her blood pressure approaches 90, but not daily due to perceived dehydration. She is currently on losartan 50 mg daily and

## 2025-05-11 ENCOUNTER — PATIENT MESSAGE (OUTPATIENT)
Dept: PRIMARY CARE CLINIC | Age: 75
End: 2025-05-11

## 2025-05-11 DIAGNOSIS — E11.9 TYPE 2 DIABETES MELLITUS WITHOUT COMPLICATION, WITHOUT LONG-TERM CURRENT USE OF INSULIN (HCC): Primary | ICD-10-CM

## 2025-05-11 DIAGNOSIS — E66.812 CLASS 2 DRUG-INDUCED OBESITY WITH SERIOUS COMORBIDITY AND BODY MASS INDEX (BMI) OF 35.0 TO 35.9 IN ADULT: ICD-10-CM

## 2025-05-11 DIAGNOSIS — E66.1 CLASS 2 DRUG-INDUCED OBESITY WITH SERIOUS COMORBIDITY AND BODY MASS INDEX (BMI) OF 35.0 TO 35.9 IN ADULT: ICD-10-CM

## 2025-06-11 DIAGNOSIS — E66.1 CLASS 2 DRUG-INDUCED OBESITY WITH SERIOUS COMORBIDITY AND BODY MASS INDEX (BMI) OF 35.0 TO 35.9 IN ADULT: ICD-10-CM

## 2025-06-11 DIAGNOSIS — E66.812 CLASS 2 DRUG-INDUCED OBESITY WITH SERIOUS COMORBIDITY AND BODY MASS INDEX (BMI) OF 35.0 TO 35.9 IN ADULT: ICD-10-CM

## 2025-06-11 DIAGNOSIS — E11.9 TYPE 2 DIABETES MELLITUS WITHOUT COMPLICATION, WITHOUT LONG-TERM CURRENT USE OF INSULIN (HCC): ICD-10-CM

## 2025-06-11 RX ORDER — TIRZEPATIDE 7.5 MG/.5ML
INJECTION, SOLUTION SUBCUTANEOUS
Qty: 2 ML | Refills: 0 | OUTPATIENT
Start: 2025-06-11

## 2025-06-16 DIAGNOSIS — E66.1 CLASS 2 DRUG-INDUCED OBESITY WITH SERIOUS COMORBIDITY AND BODY MASS INDEX (BMI) OF 35.0 TO 35.9 IN ADULT: ICD-10-CM

## 2025-06-16 DIAGNOSIS — E11.9 TYPE 2 DIABETES MELLITUS WITHOUT COMPLICATION, WITHOUT LONG-TERM CURRENT USE OF INSULIN (HCC): ICD-10-CM

## 2025-06-16 DIAGNOSIS — E66.812 CLASS 2 DRUG-INDUCED OBESITY WITH SERIOUS COMORBIDITY AND BODY MASS INDEX (BMI) OF 35.0 TO 35.9 IN ADULT: ICD-10-CM

## 2025-06-17 RX ORDER — TIRZEPATIDE 7.5 MG/.5ML
INJECTION, SOLUTION SUBCUTANEOUS
Qty: 2 ML | Refills: 2 | Status: SHIPPED | OUTPATIENT
Start: 2025-06-17

## 2025-06-19 DIAGNOSIS — I10 ESSENTIAL HYPERTENSION: ICD-10-CM

## 2025-06-19 DIAGNOSIS — M25.512 LEFT SHOULDER PAIN, UNSPECIFIED CHRONICITY: ICD-10-CM

## 2025-06-19 DIAGNOSIS — M15.0 PRIMARY OSTEOARTHRITIS INVOLVING MULTIPLE JOINTS: ICD-10-CM

## 2025-06-19 DIAGNOSIS — E11.9 TYPE 2 DIABETES MELLITUS WITHOUT COMPLICATION, WITHOUT LONG-TERM CURRENT USE OF INSULIN (HCC): ICD-10-CM

## 2025-06-19 DIAGNOSIS — F41.8 ANXIOUS DEPRESSION: ICD-10-CM

## 2025-06-19 RX ORDER — SERTRALINE HYDROCHLORIDE 100 MG/1
100 TABLET, FILM COATED ORAL DAILY
Qty: 90 TABLET | Refills: 1 | Status: SHIPPED | OUTPATIENT
Start: 2025-06-19 | End: 2026-06-19

## 2025-06-19 RX ORDER — LOSARTAN POTASSIUM 50 MG/1
50 TABLET ORAL DAILY
Qty: 90 TABLET | Refills: 1 | Status: SHIPPED | OUTPATIENT
Start: 2025-06-19 | End: 2026-06-19

## 2025-06-19 RX ORDER — CELECOXIB 200 MG/1
200 CAPSULE ORAL 2 TIMES DAILY
Qty: 180 CAPSULE | Refills: 0 | Status: SHIPPED | OUTPATIENT
Start: 2025-06-19

## 2025-07-14 DIAGNOSIS — M25.512 LEFT SHOULDER PAIN, UNSPECIFIED CHRONICITY: ICD-10-CM

## 2025-07-14 DIAGNOSIS — I10 ESSENTIAL HYPERTENSION: ICD-10-CM

## 2025-07-14 DIAGNOSIS — M15.0 PRIMARY OSTEOARTHRITIS INVOLVING MULTIPLE JOINTS: ICD-10-CM

## 2025-07-15 RX ORDER — POTASSIUM CHLORIDE 750 MG/1
10 TABLET, EXTENDED RELEASE ORAL DAILY
Qty: 90 TABLET | Refills: 2 | Status: SHIPPED | OUTPATIENT
Start: 2025-07-15

## 2025-07-15 RX ORDER — CELECOXIB 200 MG/1
200 CAPSULE ORAL 2 TIMES DAILY
Qty: 180 CAPSULE | Refills: 0 | Status: SHIPPED | OUTPATIENT
Start: 2025-07-15

## 2025-08-26 ENCOUNTER — PATIENT MESSAGE (OUTPATIENT)
Dept: PRIMARY CARE CLINIC | Age: 75
End: 2025-08-26

## 2025-08-26 DIAGNOSIS — E66.812 CLASS 2 DRUG-INDUCED OBESITY WITH SERIOUS COMORBIDITY AND BODY MASS INDEX (BMI) OF 35.0 TO 35.9 IN ADULT: ICD-10-CM

## 2025-08-26 DIAGNOSIS — E11.9 TYPE 2 DIABETES MELLITUS WITHOUT COMPLICATION, WITHOUT LONG-TERM CURRENT USE OF INSULIN (HCC): ICD-10-CM

## 2025-08-26 DIAGNOSIS — E66.1 CLASS 2 DRUG-INDUCED OBESITY WITH SERIOUS COMORBIDITY AND BODY MASS INDEX (BMI) OF 35.0 TO 35.9 IN ADULT: ICD-10-CM

## 2025-08-26 RX ORDER — TIRZEPATIDE 7.5 MG/.5ML
INJECTION, SOLUTION SUBCUTANEOUS
Qty: 2 ML | Refills: 2 | Status: SHIPPED | OUTPATIENT
Start: 2025-08-26

## 2025-08-27 RX ORDER — GLIMEPIRIDE 4 MG/1
4 TABLET ORAL
Qty: 90 TABLET | Refills: 1 | Status: SHIPPED | OUTPATIENT
Start: 2025-08-27 | End: 2026-08-27